# Patient Record
Sex: MALE | Race: WHITE | NOT HISPANIC OR LATINO | URBAN - METROPOLITAN AREA
[De-identification: names, ages, dates, MRNs, and addresses within clinical notes are randomized per-mention and may not be internally consistent; named-entity substitution may affect disease eponyms.]

---

## 2017-01-06 NOTE — ASU PATIENT PROFILE, ADULT - PSH
S/P cataract surgery  both eyes  S/P hernia surgery    S/P LASIK H/O hand surgery  right  H/O vitrectomy  left eye  S/P cataract surgery  both eyes  S/P hernia surgery    S/P LASIK    S/P TURP

## 2017-01-06 NOTE — ASU PATIENT PROFILE, ADULT - PMH
HTN (hypertension)    Prostate cancer  scheduled for surgery next month HTN (hypertension)    Prostate cancer

## 2017-01-09 ENCOUNTER — OUTPATIENT (OUTPATIENT)
Dept: OUTPATIENT SERVICES | Facility: HOSPITAL | Age: 72
LOS: 1 days | End: 2017-01-09
Payer: MEDICARE

## 2017-01-09 VITALS
OXYGEN SATURATION: 97 % | HEART RATE: 69 BPM | DIASTOLIC BLOOD PRESSURE: 83 MMHG | RESPIRATION RATE: 18 BRPM | SYSTOLIC BLOOD PRESSURE: 121 MMHG

## 2017-01-09 VITALS
RESPIRATION RATE: 22 BRPM | OXYGEN SATURATION: 100 % | HEART RATE: 68 BPM | WEIGHT: 182.54 LBS | TEMPERATURE: 98 F | DIASTOLIC BLOOD PRESSURE: 91 MMHG | HEIGHT: 67 IN | SYSTOLIC BLOOD PRESSURE: 143 MMHG

## 2017-01-09 DIAGNOSIS — Z98.890 OTHER SPECIFIED POSTPROCEDURAL STATES: Chronic | ICD-10-CM

## 2017-01-09 DIAGNOSIS — H35.372 PUCKERING OF MACULA, LEFT EYE: ICD-10-CM

## 2017-01-09 DIAGNOSIS — Z90.79 ACQUIRED ABSENCE OF OTHER GENITAL ORGAN(S): Chronic | ICD-10-CM

## 2017-01-09 PROCEDURE — 67042 VIT FOR MACULAR HOLE: CPT | Mod: LT

## 2017-01-09 NOTE — ASU DISCHARGE PLAN (ADULT/PEDIATRIC). - NOTIFY
Persistent Nausea and Vomiting/Swelling that continues/Bleeding that does not stop/Fever greater than 101/Pain not relieved by Medications

## 2018-06-14 ENCOUNTER — APPOINTMENT (OUTPATIENT)
Dept: POPULATION HEALTH | Facility: CLINIC | Age: 73
End: 2018-06-14
Payer: COMMERCIAL

## 2018-06-14 PROBLEM — C61 MALIGNANT NEOPLASM OF PROSTATE: Chronic | Status: ACTIVE | Noted: 2017-01-09

## 2018-06-14 PROCEDURE — 36415 COLL VENOUS BLD VENIPUNCTURE: CPT

## 2019-06-13 ENCOUNTER — APPOINTMENT (OUTPATIENT)
Dept: POPULATION HEALTH | Facility: CLINIC | Age: 74
End: 2019-06-13
Payer: COMMERCIAL

## 2019-06-13 PROCEDURE — 36415 COLL VENOUS BLD VENIPUNCTURE: CPT

## 2020-01-26 NOTE — ASU PREOP CHECKLIST - LATEX ALLERGY
[facility] Medicine  History and Physical    Patient:  Nelson Monroe  MRN: [id number]    CHIEF COMPLAINT:  Palpitation    History Obtained From: Patient  Family present for interview:     PCP: Lio Daly DO    HISTORY OF PRESENT ILLNESS:  66 y.o. male who presents with palpitations that he \"thought was VT. \" his defibrillator was interrogated in the emergency department and showed no episodes of SVT or VT. magnesium level within normal limits. Sodium, potassium, creatinine are within normal limits. Patient was admitted to the medicine service for further evaluation and treatment of congestive heart failure and palpitations. Review of prior records from [facility] in [address] [place] [address] record system showed:    Patient has a history of VT, HCM, defibrillator. Transthoracic echocardiogram performed at [address] [place] [address] on 7/2/2019 showed LVEF 32%, grade 2 diastolic dysfunction, severe asymmetric hypertrophy of the septum 2.6 cm, posterior wall thickness 1.5 cm, akinesis of the apical anterior apical septal apical lateral and apical myocardium. Nuclear stress test performed on 7/3/2019 at [facility] of [address] [place] [address],[address] showed:  1. Left ventricular myocardial perfusion demonstrates a large infarct involving the inferior wall with extension into contiguous lateral wall particularly toward the apex.  There is no significant ischemia identified. 2.  The left ventricular ejection fraction (LVEF) is 35%. 3.  The left ventricular wall motion demonstrates decreased motion centered at the apex with extension into immediately contiguous portions of the other wall segments. A left heart catheterization performed at [address] [place] [address] in [facility] on 9/27/2019 showed minor luminal irregularities of the LAD, first obtuse marginal and right coronary artery and otherwise angiographically normal coronary arteries.     REVIEW OF SYSTEMS:  Review of Systems  He denies loss of consciousness, dizziness. He complains of some vague pressure-like pain at the same time he had sensation of palpitations. Patient is a poor historian. Norris is unable to recall prior cardiac testing that was done within the last year. Records reviewed from outside hospital as described in the history of present illness. All other 14 review of systems negative except as noted above. Past Medical History:      Diagnosis Date    Cardiomyopathy [facility])     per pt    CHF (congestive heart failure) (HCC)     Hyperlipidemia     Hypertension     Prolonged emergence from general anesthesia     patient states he does not come out of anesthesia well-he acts ugly       Past Surgical History:      Procedure Laterality Date    PACEMAKER INSERTION      PACEMAKER PLACEMENT      [employer]    ULTRASOUND PROSTATE/TRANSRECTAL N/A 12/31/2019    TRANSRECTAL ULTRASOUND BIOPSY OF PROSTATE performed by Sri Austin MD at [address] [place] [address] OR       Medications Prior to Admission:    Prior to Admission medications    Medication Sig Start Date End Date Taking?  Authorizing Provider   naproxen (NAPROSYN) 500 MG tablet Take 1 tablet by mouth 2 times daily for 15 days 1/24/20 2/8/20 Yes BALBIR Dorsey   methocarbamol (ROBAXIN) 500 MG tablet Take 1 tablet by mouth 3 times daily for 7 days 1/24/20 1/31/20 Yes BALBIR Dorsey   carvedilol (COREG) 12.5 MG tablet Take 12.5 mg by mouth daily  8/19/19  Yes Historical Provider, MD   amiodarone (PACERONE) 400 MG tablet Take 400 mg by mouth daily  12/18/15  Yes Historical Provider, MD   lisinopril (PRINIVIL;ZESTRIL) 20 MG tablet lisinopril 20 mg tablet 7/4/19  Yes Historical Provider, MD   aspirin 81 MG tablet Take 81 mg by mouth daily    Yes Historical Provider, MD   simvastatin (ZOCOR) 20 MG tablet Take 20 mg by mouth nightly   Yes Historical Provider, MD   HYDROcodone-acetaminophen (NORCO) 7.5-325 MG per tablet Take 1 tablet by mouth every 6 hours as needed for Pain for up to 3 days. Intended supply: 3 days. Take lowest dose possible to manage pain 1/24/20 1/27/20  BALBIR Dorsey   colchicine (COLCRYS) 0.6 MG tablet Take 0.6 mg by mouth daily as needed     Historical Provider, MD   fluticasone (FLONASE) 50 MCG/ACT nasal spray 2 sprays by Nasal route daily as needed     Historical Provider, MD   indomethacin (INDOCIN) 50 MG capsule Take 50 mg by mouth daily as needed     Historical Provider, MD   nitroGLYCERIN (NITROSTAT) 0.4 MG SL tablet Place 0.4 mg under the tongue every 5 minutes as needed     Historical Provider, MD   cloNIDine (CATAPRES) 0.1 MG tablet Take 0.2 mg by mouth daily     Historical Provider, MD       Allergies:  Patient has no known allergies. Social History:   TOBACCO:   reports that he has never smoked. He has never used smokeless tobacco.  ETOH:   reports no history of alcohol use. Social History     Substance and Sexual Activity   Drug Use Never     Family History:       Family history unknown: Yes       Physical Exam:    Vitals: /67   Pulse (!) 49   Temp 98.5 °F (36.9 °C) (Oral)   Resp 14   Ht 6' 3\" (1.905 m)   Wt 299 lb (135.6 kg)   SpO2 93%   BMI 37.37 kg/m²   24HR INTAKE/OUTPUT:  No intake or output data in the 24 hours ending 01/26/20 0358    General: Alert and cooperative with exam.  Obese body habitus. Appears older than stated age and unhealthy. Unable to assess JVP due to body habitus. Respiratory: Inspection of chest normal.  No use of accessory muscles, normal diaphragmatic movements. Lungs clear to auscultation bilaterally without rales, rhonchi or wheezes. Cardiac: Regular rate and rhythm, S1, S2 normal, no murmur, click, no gallop or rubs. No reproducible chest pain on palpation of the chest wall. Vascular: 2+ peripheral pulses with no cyanosis. Abdomen: Soft, non-tender; no peritoneal signs, bowel sounds normal; no masses,  no organomegaly. Musculoskeletal: Normal joints on inspection.   No clubbing, no cyanosis or edema. Neurologic: Awake alert and oriented x3, affect and mood appropriate. Speech is fluent. Intact remote and recent memory. No focal neurologic deficits, CN II-XII intact. Psychiatric: Does not seem depressed, anxious or agitated. Denies suicidal or homicidal ideation. Skin: No rashes, nodules. Lymphatic: No lymphadenopathy. Hematologic: No bruises, petechiae. CBC:   Recent Labs     20   WBC 7.0   HGB 15.9        BMP:    Recent Labs     20      K 4.5      CO2 23   BUN 16   CREATININE 1.2   GLUCOSE 103     CMP:   Recent Labs     20      K 4.5      CO2 23   BUN 16   CREATININE 1.2   GLUCOSE 103   CALCIUM 9.2   BILITOT 0.5   ALKPHOS 64   AST 26   ALT 18     Hepatic:   Recent Labs     20   AST 26   ALT 18   BILITOT 0.5   ALKPHOS 64       Lactid Acid:  No results for input(s): LACTA in the last 72 hours. Procalcitonin:     Troponin T:   Recent Labs     20  0300   TROPONINI 0.02 0.02     Pro-BNP: No results for input(s): BNP in the last 72 hours. Lipids: No results for input(s): CHOL, HDL in the last 72 hours. Invalid input(s): LDLCALCU  INR:   Recent Labs     20   INR 1.13     A1c:Invalid input(s): HEMOGLOBIN A1C    SEPSIS Criteria:   Temp: Temp  Av.5 °F (36.9 °C)  Min: 98.5 °F (36.9 °C)  Max: 98.5 °F (36.9 °C)    HR Range: Pulse  Av  Min: 49  Max: 59   RR Range: Resp  Avg: 15.5  Min: 14  Max: 17   WBC:   Recent Labs     20   WBC 7.0      Lactic acid: No results for input(s): LACTA in the last 72 hours.    Creatinine:   Recent Labs     20   CREATININE 1.2      Troponin:   Recent Labs     20  0300   TROPONINI 0.02       LFTs:   Recent Labs     20   AST 26   ALT 18   BILITOT 0.5   ALKPHOS 64         -----------------------------------------------------------------    Imaging Studies:    XR CHEST PORTABLE    (Results Pending)       I have personally reviewed the following images:        EKG:     ECG from 1/26/202 showed left bundle branch block, normal sinus rhythm, left atrial enlargement,0 left anterior fascicular block and atrial paced and LVH. ECG from 12/27/19 showed right bundle branch block normal sinus rhythm left anterior fascicular block AV pacing and LVH. Assessment     Principal Problem:    Systolic heart failure (HCC)  Active Problems:    Palpitations    Hypertrophy of inter-atrial septum    Diastolic heart failure (HCC)    Poisoning by cardiac antiarrhythmic agent    CHF (congestive heart failure), NYHA class III, acute on chronic, combined ([facility] [address]  Resolved Problems:    * No resolved hospital problems. *      Plan     --Admit to the medicine floor overnight for cardiac monitoring. Maintain on telemetry. Serial cardiac enzymes. Consult cardiology for further evaluation. Echocardiogram ordered for the morning. -- DVT prophylaxis: SQ heparin  SQ Lovenox  Systemic anticoagulation with     -- CODE STATUS: Full code    Total face-to-face time with this patient, time spent reviewing medical records and in coordination of care with the emergency department physician, nursing staff was 75 minutes.     Signed:  Jazmín Mancilla MD 1/26/2020 3:58 AM  Admitting Hospitalist no

## 2020-06-18 ENCOUNTER — APPOINTMENT (OUTPATIENT)
Dept: POPULATION HEALTH | Facility: CLINIC | Age: 75
End: 2020-06-18
Payer: COMMERCIAL

## 2020-06-18 PROCEDURE — 36415 COLL VENOUS BLD VENIPUNCTURE: CPT

## 2022-02-11 ENCOUNTER — OUTPATIENT (OUTPATIENT)
Dept: OUTPATIENT SERVICES | Facility: HOSPITAL | Age: 77
LOS: 1 days | End: 2022-02-11
Payer: MEDICARE

## 2022-02-11 VITALS
OXYGEN SATURATION: 97 % | WEIGHT: 197.53 LBS | DIASTOLIC BLOOD PRESSURE: 80 MMHG | RESPIRATION RATE: 20 BRPM | HEIGHT: 67 IN | TEMPERATURE: 97 F | SYSTOLIC BLOOD PRESSURE: 155 MMHG | HEART RATE: 61 BPM

## 2022-02-11 DIAGNOSIS — I47.1 SUPRAVENTRICULAR TACHYCARDIA: ICD-10-CM

## 2022-02-11 DIAGNOSIS — Z98.890 OTHER SPECIFIED POSTPROCEDURAL STATES: Chronic | ICD-10-CM

## 2022-02-11 DIAGNOSIS — Z90.79 ACQUIRED ABSENCE OF OTHER GENITAL ORGAN(S): Chronic | ICD-10-CM

## 2022-02-11 DIAGNOSIS — Z01.818 ENCOUNTER FOR OTHER PREPROCEDURAL EXAMINATION: ICD-10-CM

## 2022-02-11 DIAGNOSIS — I10 ESSENTIAL (PRIMARY) HYPERTENSION: ICD-10-CM

## 2022-02-11 DIAGNOSIS — G47.33 OBSTRUCTIVE SLEEP APNEA (ADULT) (PEDIATRIC): ICD-10-CM

## 2022-02-11 DIAGNOSIS — Z91.89 OTHER SPECIFIED PERSONAL RISK FACTORS, NOT ELSEWHERE CLASSIFIED: ICD-10-CM

## 2022-02-11 DIAGNOSIS — Z96.652 PRESENCE OF LEFT ARTIFICIAL KNEE JOINT: Chronic | ICD-10-CM

## 2022-02-11 LAB
ANION GAP SERPL CALC-SCNC: 11 MMOL/L — SIGNIFICANT CHANGE UP (ref 5–17)
APTT BLD: 30 SEC — SIGNIFICANT CHANGE UP (ref 27.5–35.5)
BASOPHILS # BLD AUTO: 0 K/UL — SIGNIFICANT CHANGE UP (ref 0–0.2)
BASOPHILS NFR BLD AUTO: 0 % — SIGNIFICANT CHANGE UP (ref 0–2)
BLD GP AB SCN SERPL QL: SIGNIFICANT CHANGE UP
BUN SERPL-MCNC: 15.9 MG/DL — SIGNIFICANT CHANGE UP (ref 8–20)
CALCIUM SERPL-MCNC: 9.9 MG/DL — SIGNIFICANT CHANGE UP (ref 8.6–10.2)
CHLORIDE SERPL-SCNC: 105 MMOL/L — SIGNIFICANT CHANGE UP (ref 98–107)
CO2 SERPL-SCNC: 26 MMOL/L — SIGNIFICANT CHANGE UP (ref 22–29)
CREAT SERPL-MCNC: 0.99 MG/DL — SIGNIFICANT CHANGE UP (ref 0.5–1.3)
ELLIPTOCYTES BLD QL SMEAR: SLIGHT — SIGNIFICANT CHANGE UP
EOSINOPHIL # BLD AUTO: 0.14 K/UL — SIGNIFICANT CHANGE UP (ref 0–0.5)
EOSINOPHIL NFR BLD AUTO: 2.6 % — SIGNIFICANT CHANGE UP (ref 0–6)
GIANT PLATELETS BLD QL SMEAR: PRESENT — SIGNIFICANT CHANGE UP
GLUCOSE SERPL-MCNC: 96 MG/DL — SIGNIFICANT CHANGE UP (ref 70–99)
HCT VFR BLD CALC: 45.6 % — SIGNIFICANT CHANGE UP (ref 39–50)
HGB BLD-MCNC: 15.3 G/DL — SIGNIFICANT CHANGE UP (ref 13–17)
INR BLD: 0.97 RATIO — SIGNIFICANT CHANGE UP (ref 0.88–1.16)
LYMPHOCYTES # BLD AUTO: 0.87 K/UL — LOW (ref 1–3.3)
LYMPHOCYTES # BLD AUTO: 15.8 % — SIGNIFICANT CHANGE UP (ref 13–44)
MAGNESIUM SERPL-MCNC: 2.2 MG/DL — SIGNIFICANT CHANGE UP (ref 1.6–2.6)
MANUAL SMEAR VERIFICATION: SIGNIFICANT CHANGE UP
MCHC RBC-ENTMCNC: 31.2 PG — SIGNIFICANT CHANGE UP (ref 27–34)
MCHC RBC-ENTMCNC: 33.6 GM/DL — SIGNIFICANT CHANGE UP (ref 32–36)
MCV RBC AUTO: 92.9 FL — SIGNIFICANT CHANGE UP (ref 80–100)
MONOCYTES # BLD AUTO: 0.97 K/UL — HIGH (ref 0–0.9)
MONOCYTES NFR BLD AUTO: 17.6 % — HIGH (ref 2–14)
NEUTROPHILS # BLD AUTO: 3.39 K/UL — SIGNIFICANT CHANGE UP (ref 1.8–7.4)
NEUTROPHILS NFR BLD AUTO: 61.4 % — SIGNIFICANT CHANGE UP (ref 43–77)
OVALOCYTES BLD QL SMEAR: SLIGHT — SIGNIFICANT CHANGE UP
PLAT MORPH BLD: NORMAL — SIGNIFICANT CHANGE UP
PLATELET # BLD AUTO: 199 K/UL — SIGNIFICANT CHANGE UP (ref 150–400)
POIKILOCYTOSIS BLD QL AUTO: SLIGHT — SIGNIFICANT CHANGE UP
POTASSIUM SERPL-MCNC: 5.2 MMOL/L — SIGNIFICANT CHANGE UP (ref 3.5–5.3)
POTASSIUM SERPL-SCNC: 5.2 MMOL/L — SIGNIFICANT CHANGE UP (ref 3.5–5.3)
PROTHROM AB SERPL-ACNC: 11.3 SEC — SIGNIFICANT CHANGE UP (ref 10.6–13.6)
RBC # BLD: 4.91 M/UL — SIGNIFICANT CHANGE UP (ref 4.2–5.8)
RBC # FLD: 13.4 % — SIGNIFICANT CHANGE UP (ref 10.3–14.5)
RBC BLD AUTO: ABNORMAL
SODIUM SERPL-SCNC: 142 MMOL/L — SIGNIFICANT CHANGE UP (ref 135–145)
VARIANT LYMPHS # BLD: 2.6 % — SIGNIFICANT CHANGE UP (ref 0–6)
WBC # BLD: 5.52 K/UL — SIGNIFICANT CHANGE UP (ref 3.8–10.5)
WBC # FLD AUTO: 5.52 K/UL — SIGNIFICANT CHANGE UP (ref 3.8–10.5)

## 2022-02-11 PROCEDURE — G0463: CPT

## 2022-02-11 PROCEDURE — 93005 ELECTROCARDIOGRAM TRACING: CPT

## 2022-02-11 PROCEDURE — 93010 ELECTROCARDIOGRAM REPORT: CPT

## 2022-02-11 RX ORDER — MELOXICAM 15 MG/1
1 TABLET ORAL
Qty: 0 | Refills: 0 | DISCHARGE

## 2022-02-11 NOTE — H&P PST ADULT - NSICDXPASTSURGICALHX_GEN_ALL_CORE_FT
PAST SURGICAL HISTORY:  H/O hand surgery right    H/O vitrectomy left eye    S/P cataract surgery both eyes    S/P hernia surgery     S/P LASIK     S/P TURP      PAST SURGICAL HISTORY:  H/O eye surgery left eye detatched retina - 2012    H/O hand surgery right 1971    H/O prostatectomy 2012    H/O vitrectomy left eye    S/P cataract surgery both eyes    S/P hernia surgery 1985    S/P LASIK     S/P TURP      PAST SURGICAL HISTORY:  H/O eye surgery left eye detatched retina - 2012    H/O hand surgery right 1971    H/O prostatectomy 2012    H/O total knee replacement, left 2012    H/O vitrectomy left eye    S/P cataract surgery both eyes    S/P hernia surgery 1985    S/P LASIK     S/P TURP

## 2022-02-11 NOTE — H&P PST ADULT - PROBLEM SELECTOR PLAN 1
Hold Flecainide and metoprolol 3 days before procedure as per Dr. Johnson Hold Flecainide and metoprolol 3 days before procedure as per Dr. Johnson  Patient is scheduled for SVT ablation/ ANES/ LEESA/ ABBOT with Dr. Dirk Johnson on 2/23/2022

## 2022-02-11 NOTE — H&P PST ADULT - ATTENDING COMMENTS
All risks, benefits and alternatives discussed with patient. He agrees to proceed.    Dirk Johnson MD  Clinical Cardiac Electrophysiology

## 2022-02-11 NOTE — H&P PST ADULT - ALLERGY TYPES
dog dander/outdoor environmental allergies/reactions to medicines/reactions to food/reactions to animals

## 2022-02-11 NOTE — H&P PST ADULT - PROBLEM SELECTOR PLAN 2
Caprini Score, at risk, surgical team to order appropriate VTE prophylaxis Caprini Score 6, at risk, surgical team to order appropriate VTE prophylaxis

## 2022-02-11 NOTE — H&P PST ADULT - PROBLEM SELECTOR PLAN 4
Intermediate/High Risk, Stop Bang Score 5, RUBEN precautions High Risk, Stop Bang Score 5, RUBEN precautions

## 2022-02-11 NOTE — H&P PST ADULT - PROBLEM SELECTOR PLAN 3
Take lisinopril the morning of procedure with small sip of water Take lisinopril and amlodipine the morning of procedure with small sip of water  monitor BP

## 2022-02-11 NOTE — H&P PST ADULT - NSICDXPASTMEDICALHX_GEN_ALL_CORE_FT
PAST MEDICAL HISTORY:  Atrial tachycardia     HTN (hypertension)     Prostate cancer      PAST MEDICAL HISTORY:  Atrial tachycardia     Glaucoma     HTN (hypertension)     Prostate cancer     SVT (supraventricular tachycardia)

## 2022-02-11 NOTE — H&P PST ADULT - ASSESSMENT
Plan:   Labs pending.   Pre-procedure instructions provided (verbal & written) as follows:  Ablation 2022    - NPO after midnight prior except meds w/ sips of water.    - Hold Flecainide and metoprolol 3 days before procedure as per Dr. Jonhson   - Take lisinopril the morning of procedure with small sip of water  - Covid-19 PCT test scheduled for 2022      CAPRINI SCORE [CLOT]    AGE RELATED RISK FACTORS                                                       MOBILITY RELATED FACTORS  [ ] Age 41-60 years                                            (1 Point)                  [ ] Bed rest                                                        (1 Point)  [ ] Age: 61-74 years                                           (2 Points)                 [ ] Plaster cast                                                   (2 Points)  [ ] Age= 75 years                                              (3 Points)                 [ ] Bed bound for more than 72 hours                 (2 Points)    DISEASE RELATED RISK FACTORS                                               GENDER SPECIFIC FACTORS  [ ] Edema in the lower extremities                       (1 Point)                  [ ] Pregnancy                                                     (1 Point)  [ ] Varicose veins                                               (1 Point)                  [ ] Post-partum < 6 weeks                                   (1 Point)             [ ] BMI > 25 Kg/m2                                            (1 Point)                  [ ] Hormonal therapy  or oral contraception          (1 Point)                 [ ] Sepsis (in the previous month)                        (1 Point)                  [ ] History of pregnancy complications                 (1 point)  [ ] Pneumonia or serious lung disease                                               [ ] Unexplained or recurrent                     (1 Point)           (in the previous month)                               (1 Point)  [ ] Abnormal pulmonary function test                     (1 Point)                 SURGERY RELATED RISK FACTORS  [ ] Acute myocardial infarction                              (1 Point)                 [ ]  Section                                             (1 Point)  [ ] Congestive heart failure (in the previous month)  (1 Point)               [ ] Minor surgery                                                  (1 Point)   [ ] Inflammatory bowel disease                             (1 Point)                 [ ] Arthroscopic surgery                                        (2 Points)  [ ] Central venous access                                      (2 Points)                [ ] General surgery lasting more than 45 minutes   (2 Points)       [ ] Stroke (in the previous month)                          (5 Points)               [ ] Elective arthroplasty                                         (5 Points)                                                                                                                                               HEMATOLOGY RELATED FACTORS                                                 TRAUMA RELATED RISK FACTORS  [ ] Prior episodes of VTE                                     (3 Points)                [ ] Fracture of the hip, pelvis, or leg                       (5 Points)  [ ] Positive family history for VTE                         (3 Points)                 [ ] Acute spinal cord injury (in the previous month)  (5 Points)  [ ] Prothrombin 81761 A                                     (3 Points)                 [ ] Paralysis  (less than 1 month)                             (5 Points)  [ ] Factor V Leiden                                             (3 Points)                  [ ] Multiple Trauma within 1 month                        (5 Points)  [ ] Lupus anticoagulants                                     (3 Points)                                                           [ ] Anticardiolipin antibodies                               (3 Points)                                                       [ ] High homocysteine in the blood                      (3 Points)                                             [ ] Other congenital or acquired thrombophilia      (3 Points)                                                [ ] Heparin induced thrombocytopenia                  (3 Points)                                          Total Score [          ]    Caprini Score 0 - 2:  Low Risk, No VTE Prophylaxis required for most patients, encourage ambulation  Caprini Score 3 - 6:  At Risk, pharmacologic VTE prophylaxis is indicated for most patients (in the absence of a contraindication)  Caprini Score Greater than or = 7:  High Risk, pharmacologic VTE prophylaxis is indicated for most patients (in the absence of a contraindication)    OPIOID RISK TOOL    HELLEN EACH BOX THAT APPLIES AND ADD TOTALS AT THE END    FAMILY HISTORY OF SUBSTANCE ABUSE                 FEMALE         MALE                                                Alcohol                             [  ]1 pt          [  ]3pts                                               Illegal Durgs                     [  ]2 pts        [  ]3pts                                               Rx Drugs                           [  ]4 pts        [  ]4 pts    PERSONAL HISTORY OF SUBSTANCE ABUSE                                                                                          Alcohol                             [  ]3 pts       [  ]3 pts                                               Illegal Drugs                     [  ]4 pts        [  ]4 pts                                               Rx Drugs                           [  ]5 pts        [  ]5 pts    AGE BETWEEN 16-45 YEARS                                      [  ]1 pt         [  ]1 pt    HISTORY OF PREADOLESCENT   SEXUAL ABUSE                                                             [  ]3 pts        [  ]0pts    PSYCHOLOGICAL DISEASE                     ADD, OCD, Bipolar, Schizophrenia        [  ]2 pts         [  ]2 pts                      Depression                                               [  ]1 pt           [  ]1 pt           SCORING TOTAL   (add numbers and type here)              (***)                                     A score of 3 or lower indicated LOW risk for future opioid abuse  A score of 4 to 7 indicated moderate risk for future opioid abuse  A score of 8 or higher indicates a high risk for opioid abuse     77y Male with history of HTN, glaucoma, prostate CA s/p prostatectomy  and symptomatic atrial tachycardia presents to PST. Patient was seen in [facility] in 2021 for symptoms of dyspnea on exertion and chest tightness found to have an arrythmia of atrial tachycardia. He was noted to have salcos of atrial tachycardia so he was treated with AVN blockers, He underwent TTE which showed an LVEF of 45-50%. Stress testing revealed no perfusion abnormalities with and LVEF of 44%. He was started on flecainide 100mg BID as well as Metoprolol 100mg BID and was discharged. Patient states he has been taking his medication as prescribed with no complications. He denies palpitations, chest pain, dizziness/lightheadedness or syncope. States he has been more tired in the afternoons. Patient is scheduled for SVT ablation/ ANES/ LEESA/ ABBOT with Dr. Dirk Johnson on 2022.     Plan:   Labs pending.   Pre-procedure instructions provided (verbal & written) as follows:  Ablation 2022    - NPO after midnight prior except meds w/ sips of water.    - Hold Flecainide and metoprolol 3 days before procedure as per Dr. Johnson   - Take lisinopril the morning of procedure with small sip of water   - Covid-19 PCT test scheduled for 2022      CAPRINI SCORE [CLOT]    AGE RELATED RISK FACTORS                                                       MOBILITY RELATED FACTORS  [ ] Age 41-60 years                                            (1 Point)                  [ ] Bed rest                                                        (1 Point)  [ ] Age: 61-74 years                                           (2 Points)                 [ ] Plaster cast                                                   (2 Points)  [ ] Age= 75 years                                              (3 Points)                 [ ] Bed bound for more than 72 hours                 (2 Points)    DISEASE RELATED RISK FACTORS                                               GENDER SPECIFIC FACTORS  [ ] Edema in the lower extremities                       (1 Point)                  [ ] Pregnancy                                                     (1 Point)  [ ] Varicose veins                                               (1 Point)                  [ ] Post-partum < 6 weeks                                   (1 Point)             [ ] BMI > 25 Kg/m2                                            (1 Point)                  [ ] Hormonal therapy  or oral contraception          (1 Point)                 [ ] Sepsis (in the previous month)                        (1 Point)                  [ ] History of pregnancy complications                 (1 point)  [ ] Pneumonia or serious lung disease                                               [ ] Unexplained or recurrent                     (1 Point)           (in the previous month)                               (1 Point)  [ ] Abnormal pulmonary function test                     (1 Point)                 SURGERY RELATED RISK FACTORS  [ ] Acute myocardial infarction                              (1 Point)                 [ ]  Section                                             (1 Point)  [ ] Congestive heart failure (in the previous month)  (1 Point)               [ ] Minor surgery                                                  (1 Point)   [ ] Inflammatory bowel disease                             (1 Point)                 [ ] Arthroscopic surgery                                        (2 Points)  [ ] Central venous access                                      (2 Points)                [ ] General surgery lasting more than 45 minutes   (2 Points)       [ ] Stroke (in the previous month)                          (5 Points)               [ ] Elective arthroplasty                                         (5 Points)                                                                                                                                               HEMATOLOGY RELATED FACTORS                                                 TRAUMA RELATED RISK FACTORS  [ ] Prior episodes of VTE                                     (3 Points)                [ ] Fracture of the hip, pelvis, or leg                       (5 Points)  [ ] Positive family history for VTE                         (3 Points)                 [ ] Acute spinal cord injury (in the previous month)  (5 Points)  [ ] Prothrombin 07922 A                                     (3 Points)                 [ ] Paralysis  (less than 1 month)                             (5 Points)  [ ] Factor V Leiden                                             (3 Points)                  [ ] Multiple Trauma within 1 month                        (5 Points)  [ ] Lupus anticoagulants                                     (3 Points)                                                           [ ] Anticardiolipin antibodies                               (3 Points)                                                       [ ] High homocysteine in the blood                      (3 Points)                                             [ ] Other congenital or acquired thrombophilia      (3 Points)                                                [ ] Heparin induced thrombocytopenia                  (3 Points)                                          Total Score [          ]    Caprini Score 0 - 2:  Low Risk, No VTE Prophylaxis required for most patients, encourage ambulation  Caprini Score 3 - 6:  At Risk, pharmacologic VTE prophylaxis is indicated for most patients (in the absence of a contraindication)  Caprini Score Greater than or = 7:  High Risk, pharmacologic VTE prophylaxis is indicated for most patients (in the absence of a contraindication)    OPIOID RISK TOOL    HELLEN EACH BOX THAT APPLIES AND ADD TOTALS AT THE END    FAMILY HISTORY OF SUBSTANCE ABUSE                 FEMALE         MALE                                                Alcohol                             [  ]1 pt          [  ]3pts                                               Illegal Durgs                     [  ]2 pts        [  ]3pts                                               Rx Drugs                           [  ]4 pts        [  ]4 pts    PERSONAL HISTORY OF SUBSTANCE ABUSE                                                                                          Alcohol                             [  ]3 pts       [  ]3 pts                                               Illegal Drugs                     [  ]4 pts        [  ]4 pts                                               Rx Drugs                           [  ]5 pts        [  ]5 pts    AGE BETWEEN 16-45 YEARS                                      [  ]1 pt         [  ]1 pt    HISTORY OF PREADOLESCENT   SEXUAL ABUSE                                                             [  ]3 pts        [  ]0pts    PSYCHOLOGICAL DISEASE                     ADD, OCD, Bipolar, Schizophrenia        [  ]2 pts         [  ]2 pts                      Depression                                               [  ]1 pt           [  ]1 pt           SCORING TOTAL   (add numbers and type here)              (***)                                     A score of 3 or lower indicated LOW risk for future opioid abuse  A score of 4 to 7 indicated moderate risk for future opioid abuse  A score of 8 or higher indicates a high risk for opioid abuse     77y Male with history of HTN, glaucoma, prostate CA s/p prostatectomy  and symptomatic atrial tachycardia presents to PST. Patient was seen in [facility] in 2021 for symptoms of dyspnea on exertion and chest tightness found to have an arrythmia of atrial tachycardia. He was noted to have salcos of atrial tachycardia so he was treated with AVN blockers, He underwent TTE which showed an LVEF of 45-50%. Stress testing revealed no perfusion abnormalities with and LVEF of 44%. He was started on flecainide 100mg BID as well as Metoprolol 100mg BID and was discharged. Patient states he has been taking his medication as prescribed with no complications. He denies palpitations, chest pain, dizziness/lightheadedness or syncope. States he has been more tired in the afternoons. Patient is scheduled for SVT ablation/ ANES/ LEESA/ ABBOT with Dr. Dirk Johnson on 2022.     Plan:   Labs pending.   Pre-procedure instructions provided (verbal & written) as follows:  Ablation 2022    - NPO after midnight prior except meds w/ sips of water.    - Hold Flecainide and metoprolol 3 days before procedure as per Dr. Johnson   - Take lisinopril and amlodipine the morning of procedure with small sip of water   - Covid-19 PCT test scheduled for 2022      CAPRINI SCORE [CLOT]    AGE RELATED RISK FACTORS                                                       MOBILITY RELATED FACTORS  [ ] Age 41-60 years                                            (1 Point)                  [ ] Bed rest                                                        (1 Point)  [ ] Age: 61-74 years                                           (2 Points)                 [ ] Plaster cast                                                   (2 Points)  [x ] Age= 75 years                                              (3 Points)                 [ ] Bed bound for more than 72 hours                 (2 Points)    DISEASE RELATED RISK FACTORS                                               GENDER SPECIFIC FACTORS  [ ] Edema in the lower extremities                       (1 Point)                  [ ] Pregnancy                                                     (1 Point)  [ ] Varicose veins                                               (1 Point)                  [ ] Post-partum < 6 weeks                                   (1 Point)             [x ] BMI > 25 Kg/m2                                            (1 Point)                  [ ] Hormonal therapy  or oral contraception          (1 Point)                 [ ] Sepsis (in the previous month)                        (1 Point)                  [ ] History of pregnancy complications                 (1 point)  [ ] Pneumonia or serious lung disease                                               [ ] Unexplained or recurrent                     (1 Point)           (in the previous month)                               (1 Point)  [ ] Abnormal pulmonary function test                     (1 Point)                 SURGERY RELATED RISK FACTORS  [ ] Acute myocardial infarction                              (1 Point)                 [ ]  Section                                             (1 Point)  [ ] Congestive heart failure (in the previous month)  (1 Point)               [ ] Minor surgery                                                  (1 Point)   [ ] Inflammatory bowel disease                             (1 Point)                 [ ] Arthroscopic surgery                                        (2 Points)  [ ] Central venous access                                      (2 Points)                [x ] General surgery lasting more than 45 minutes   (2 Points)       [ ] Stroke (in the previous month)                          (5 Points)               [ ] Elective arthroplasty                                         (5 Points)                                                                                                                                               HEMATOLOGY RELATED FACTORS                                                 TRAUMA RELATED RISK FACTORS  [ ] Prior episodes of VTE                                     (3 Points)                [ ] Fracture of the hip, pelvis, or leg                       (5 Points)  [ ] Positive family history for VTE                         (3 Points)                 [ ] Acute spinal cord injury (in the previous month)  (5 Points)  [ ] Prothrombin 46786 A                                     (3 Points)                 [ ] Paralysis  (less than 1 month)                             (5 Points)  [ ] Factor V Leiden                                             (3 Points)                  [ ] Multiple Trauma within 1 month                        (5 Points)  [ ] Lupus anticoagulants                                     (3 Points)                                                           [ ] Anticardiolipin antibodies                               (3 Points)                                                       [ ] High homocysteine in the blood                      (3 Points)                                             [ ] Other congenital or acquired thrombophilia      (3 Points)                                                [ ] Heparin induced thrombocytopenia                  (3 Points)                                          Total Score [   6     ]    Caprini Score 0 - 2:  Low Risk, No VTE Prophylaxis required for most patients, encourage ambulation  Caprini Score 3 - 6:  At Risk, pharmacologic VTE prophylaxis is indicated for most patients (in the absence of a contraindication)  Caprini Score Greater than or = 7:  High Risk, pharmacologic VTE prophylaxis is indicated for most patients (in the absence of a contraindication)    OPIOID RISK TOOL    HELLEN EACH BOX THAT APPLIES AND ADD TOTALS AT THE END    FAMILY HISTORY OF SUBSTANCE ABUSE                 FEMALE         MALE                                                Alcohol                             [  ]1 pt          [  ]3pts                                               Illegal Durgs                     [  ]2 pts        [  ]3pts                                               Rx Drugs                           [  ]4 pts        [  ]4 pts    PERSONAL HISTORY OF SUBSTANCE ABUSE                                                                                          Alcohol                             [  ]3 pts       [  ]3 pts                                               Illegal Drugs                     [  ]4 pts        [  ]4 pts                                               Rx Drugs                           [  ]5 pts        [  ]5 pts    AGE BETWEEN 16-45 YEARS                                      [  ]1 pt         [  ]1 pt    HISTORY OF PREADOLESCENT   SEXUAL ABUSE                                                             [  ]3 pts        [  ]0pts    PSYCHOLOGICAL DISEASE                     ADD, OCD, Bipolar, Schizophrenia        [  ]2 pts         [  ]2 pts                      Depression                                               [  ]1 pt           [  ]1 pt           SCORING TOTAL   (add numbers and type here)              (0)                                     A score of 3 or lower indicated LOW risk for future opioid abuse  A score of 4 to 7 indicated moderate risk for future opioid abuse  A score of 8 or higher indicates a high risk for opioid abuse

## 2022-02-11 NOTE — ASU PATIENT PROFILE, ADULT - VISION (WITH CORRECTIVE LENSES IF THE PATIENT USUALLY WEARS THEM):
glasses 2 pair, reading and distance/Partially impaired: cannot see medication labels or newsprint, but can see obstacles in path, and the surrounding layout; can count fingers at arm's length

## 2022-02-11 NOTE — H&P PST ADULT - WEIGHT IN KG
Hatchet Flap Text: The defect edges were debeveled with a #15 scalpel blade.  Given the location of the defect, shape of the defect and the proximity to free margins a hatchet flap was deemed most appropriate.  Using a sterile surgical marker, an appropriate hatchet flap was drawn incorporating the defect and placing the expected incisions within the relaxed skin tension lines where possible.    The area thus outlined was incised deep to adipose tissue with a #15 scalpel blade.  The skin margins were undermined to an appropriate distance in all directions utilizing iris scissors. 89.6

## 2022-02-11 NOTE — H&P PST ADULT - HISTORY OF PRESENT ILLNESS
77y Male with history of HTN, glaucoma, and symptomatic atrial tachycardia presents to PST. Patient was seen in [facility] in 12/2021 for symptoms of dyspnea on exertion and chest tightness found to have an arrythmia of atrial tachycardia. He was noted to have salcos of atrial tachycardia so he was treated with AVN blockers, He underwent TTE which showed an LVEF of 45-50%. Stress testing revealed no perfusion abnormalities with and LVEF of 44%. He was starte don flecainide 100mg BID as well as Metoprolol 100mg BID and was discharged. He denies palpitations, chest pain, dizziness/lightheadedness or syncope.     Patient is scheduled for Atrial ablation with Dr. Dirk Johnson on 2/23/2022.       Echocardiogram (date): HELEN 12/2/2021 LEFT VENTRICLE: Left ventricular systolic function is low - normal. LVEF range is estimated at 50 % -55 %. IVS: Interventricular septal thickness is mildly increased. RIGHT VENTRICLE: The right ventricular global function is normal. Right ventricular systolic pressure is elevated at 40-50 mmHg. MITRAL VALVE: Mild mitral valve regurgitation. AORTIC VALVE: Mild aortic valve regurgitation. TRICUSPID VALVE: Mild tricuspid valve regurgitation. RVSP: 41 mmHg. The ejection fraction is 44 %  Stress Test (date): Nuclear stress test 12/7/2021 Regadenoson NST: LVEF 44%.  ml, ESV of 104 ml. There is an area or fixed defect in the apical cap that improves with prone imaging with no other significant perfusion defects noted. Overall the study appears to be normal without myocardial ischemia.   Cardiac CT or MRI (date):   Cardiac Cath (date):   Cardiac surgery (date):  77y Male with history of HTN, glaucoma, prostate CA s/p prostatectomy 2012 and symptomatic atrial tachycardia presents to PST. Patient was seen in [facility] in 12/2021 for symptoms of dyspnea on exertion and chest tightness found to have an arrythmia of atrial tachycardia. He was noted to have salcos of atrial tachycardia so he was treated with AVN blockers, He underwent TTE which showed an LVEF of 45-50%. Stress testing revealed no perfusion abnormalities with and LVEF of 44%. He was started on flecainide 100mg BID as well as Metoprolol 100mg BID and was discharged. Patient states he has been taking his medication as prescribed with no complications. He denies palpitations, chest pain, dizziness/lightheadedness or syncope. States he has been more tired in the afternoons. Patient is scheduled for SVT ablation/ ANES/ LEESA/ ABBOT with Dr. Dirk Johnson on 2/23/2022.     Echocardiogram (date): HELEN 12/2/2021 LEFT VENTRICLE: Left ventricular systolic function is low - normal. LVEF range is estimated at 50 % -55 %. IVS: Interventricular septal thickness is mildly increased. RIGHT VENTRICLE: The right ventricular global function is normal. Right ventricular systolic pressure is elevated at 40-50 mmHg. MITRAL VALVE: Mild mitral valve regurgitation. AORTIC VALVE: Mild aortic valve regurgitation. TRICUSPID VALVE: Mild tricuspid valve regurgitation. RVSP: 41 mmHg. The ejection fraction is 44 %  Stress Test (date): Nuclear stress test 12/7/2021 Regadenoson NST: LVEF 44%.  ml, ESV of 104 ml. There is an area or fixed defect in the apical cap that improves with prone imaging with no other significant perfusion defects noted. Overall the study appears to be normal without myocardial ischemia.   Cardiac CT or MRI (date):   Cardiac Cath (date):   Cardiac surgery (date):

## 2022-02-23 ENCOUNTER — OUTPATIENT (OUTPATIENT)
Dept: OUTPATIENT SERVICES | Facility: HOSPITAL | Age: 77
LOS: 1 days | End: 2022-02-23
Payer: MEDICARE

## 2022-02-23 ENCOUNTER — TRANSCRIPTION ENCOUNTER (OUTPATIENT)
Age: 77
End: 2022-02-23

## 2022-02-23 VITALS
SYSTOLIC BLOOD PRESSURE: 158 MMHG | TEMPERATURE: 98 F | DIASTOLIC BLOOD PRESSURE: 80 MMHG | RESPIRATION RATE: 14 BRPM | HEART RATE: 85 BPM | OXYGEN SATURATION: 97 %

## 2022-02-23 VITALS
DIASTOLIC BLOOD PRESSURE: 74 MMHG | RESPIRATION RATE: 16 BRPM | SYSTOLIC BLOOD PRESSURE: 168 MMHG | OXYGEN SATURATION: 96 % | HEART RATE: 82 BPM

## 2022-02-23 DIAGNOSIS — Z90.79 ACQUIRED ABSENCE OF OTHER GENITAL ORGAN(S): Chronic | ICD-10-CM

## 2022-02-23 DIAGNOSIS — Z98.890 OTHER SPECIFIED POSTPROCEDURAL STATES: Chronic | ICD-10-CM

## 2022-02-23 DIAGNOSIS — Z96.652 PRESENCE OF LEFT ARTIFICIAL KNEE JOINT: Chronic | ICD-10-CM

## 2022-02-23 DIAGNOSIS — I47.1 SUPRAVENTRICULAR TACHYCARDIA: ICD-10-CM

## 2022-02-23 LAB — ABO RH CONFIRMATION: SIGNIFICANT CHANGE UP

## 2022-02-23 PROCEDURE — C1731: CPT

## 2022-02-23 PROCEDURE — 93005 ELECTROCARDIOGRAM TRACING: CPT

## 2022-02-23 PROCEDURE — 93623 PRGRMD STIMJ&PACG IV RX NFS: CPT | Mod: 26

## 2022-02-23 PROCEDURE — 93653 COMPRE EP EVAL TX SVT: CPT

## 2022-02-23 PROCEDURE — 93623 PRGRMD STIMJ&PACG IV RX NFS: CPT

## 2022-02-23 PROCEDURE — C1732: CPT

## 2022-02-23 PROCEDURE — C1730: CPT

## 2022-02-23 PROCEDURE — 36415 COLL VENOUS BLD VENIPUNCTURE: CPT

## 2022-02-23 PROCEDURE — C1766: CPT

## 2022-02-23 PROCEDURE — 93010 ELECTROCARDIOGRAM REPORT: CPT

## 2022-02-23 RX ORDER — LEFLUNOMIDE 10 MG/1
1 TABLET ORAL
Qty: 0 | Refills: 0 | DISCHARGE

## 2022-02-23 RX ORDER — BENZOCAINE AND MENTHOL 5; 1 G/100ML; G/100ML
1 LIQUID ORAL
Refills: 0 | Status: DISCONTINUED | OUTPATIENT
Start: 2022-02-23 | End: 2022-03-09

## 2022-02-23 RX ORDER — METOPROLOL TARTRATE 50 MG
1 TABLET ORAL
Qty: 0 | Refills: 0 | DISCHARGE
Start: 2022-02-23

## 2022-02-23 RX ORDER — METOPROLOL TARTRATE 50 MG
100 TABLET ORAL DAILY
Refills: 0 | Status: DISCONTINUED | OUTPATIENT
Start: 2022-02-23 | End: 2022-03-09

## 2022-02-23 RX ORDER — DORZOLAMIDE HYDROCHLORIDE 20 MG/ML
1 SOLUTION/ DROPS OPHTHALMIC
Qty: 0 | Refills: 0 | DISCHARGE

## 2022-02-23 RX ORDER — FLECAINIDE ACETATE 50 MG
1 TABLET ORAL
Qty: 0 | Refills: 0 | DISCHARGE

## 2022-02-23 RX ORDER — CYCLOSPORINE 0.5 MG/ML
1 EMULSION OPHTHALMIC
Qty: 0 | Refills: 0 | DISCHARGE

## 2022-02-23 RX ORDER — ACETAMINOPHEN 500 MG
650 TABLET ORAL EVERY 6 HOURS
Refills: 0 | Status: DISCONTINUED | OUTPATIENT
Start: 2022-02-23 | End: 2022-03-09

## 2022-02-23 RX ORDER — LISINOPRIL 2.5 MG/1
40 TABLET ORAL DAILY
Refills: 0 | Status: DISCONTINUED | OUTPATIENT
Start: 2022-02-23 | End: 2022-03-09

## 2022-02-23 RX ORDER — ONDANSETRON 8 MG/1
4 TABLET, FILM COATED ORAL EVERY 8 HOURS
Refills: 0 | Status: DISCONTINUED | OUTPATIENT
Start: 2022-02-23 | End: 2022-03-09

## 2022-02-23 RX ORDER — ALPRAZOLAM 0.25 MG
0.25 TABLET ORAL EVERY 6 HOURS
Refills: 0 | Status: DISCONTINUED | OUTPATIENT
Start: 2022-02-23 | End: 2022-02-23

## 2022-02-23 RX ORDER — AMLODIPINE BESYLATE 2.5 MG/1
1 TABLET ORAL
Qty: 0 | Refills: 0 | DISCHARGE

## 2022-02-23 RX ORDER — METOPROLOL TARTRATE 50 MG
1 TABLET ORAL
Qty: 0 | Refills: 0 | DISCHARGE

## 2022-02-23 RX ORDER — AMLODIPINE BESYLATE 2.5 MG/1
2.5 TABLET ORAL DAILY
Refills: 0 | Status: DISCONTINUED | OUTPATIENT
Start: 2022-02-23 | End: 2022-03-09

## 2022-02-23 RX ORDER — LISINOPRIL 2.5 MG/1
1 TABLET ORAL
Qty: 0 | Refills: 0 | DISCHARGE

## 2022-02-23 RX ORDER — ZOLPIDEM TARTRATE 10 MG/1
1 TABLET ORAL
Qty: 0 | Refills: 0 | DISCHARGE

## 2022-02-23 NOTE — DISCHARGE NOTE PROVIDER - CARE PROVIDER_API CALL
Dirk Johnson)  Cardiac Electrophysiology; Cardiology  [address] [place] [address], [address]  [place] [address]  Phone: [phone]  Fax: [phone]  Follow Up Time:

## 2022-02-23 NOTE — PROGRESS NOTE ADULT - SUBJECTIVE AND OBJECTIVE BOX
Admission Criteria  Please admit the patient to the following service: CARDIOLOGY    Major Criteria:    - Continuous EKG monitoring is required for condition causing arrhythmia (hyperkalemia, etc)  - Significant volume load > 200 ml      Admit to: 3GUL     Patient is being admitted to the inpatient service due to high risk characteristics and need for further management/monitoring and is considered to be at a significantly increased risk of major adverse cardiac and vascular events if discharged.

## 2022-02-23 NOTE — DISCHARGE NOTE PROVIDER - NSDCMRMEDTOKEN_GEN_ALL_CORE_FT
amLODIPine 2.5 mg oral tablet: 1 tab(s) orally once a day  dorzolamide 2% ophthalmic solution: 1 drop(s) to each affected eye 3 times a day  flecainide 100 mg oral tablet: 1 tab(s) orally every 12 hours  leflunomide 10 mg oral tablet: 1 tab(s) orally once a day  lisinopril 40 mg oral tablet: 1 tab(s) orally once a day  metoprolol succinate 100 mg oral capsule, extended release: 1 cap(s) orally 2 times a day  Restasis 0.05% ophthalmic emulsion: 1 drop(s) to each affected eye every 12 hours  zolpidem 10 mg oral tablet: 1 tab(s) orally once a day (at bedtime), As Needed   amLODIPine 2.5 mg oral tablet: 1 tab(s) orally once a day  dorzolamide 2% ophthalmic solution: 1 drop(s) to each affected eye 3 times a day  leflunomide 10 mg oral tablet: 1 tab(s) orally once a day  lisinopril 40 mg oral tablet: 1 tab(s) orally once a day  metoprolol succinate 100 mg oral tablet, extended release: 1 tab(s) orally once a day  Restasis 0.05% ophthalmic emulsion: 1 drop(s) to each affected eye every 12 hours  zolpidem 10 mg oral tablet: 1 tab(s) orally once a day (at bedtime), As Needed

## 2022-02-23 NOTE — DISCHARGE NOTE PROVIDER - NSDCFUADDINST_GEN_ALL_CORE_FT
Our office will contact you in 3-5 days to schedule this appointment. Please call [phone] with questions or concerns. Our office will contact you in 3-5 days to schedule this appointment. Please call [phone] with questions or concerns.  Decrease your Metoprolol to 100mg ONCE daily.  Discontinue Flecainide.

## 2022-02-23 NOTE — PROGRESS NOTE ADULT - SUBJECTIVE AND OBJECTIVE BOX
Pt doing well s/p SVT ablation. Hemostatic sutures removed; sites C/D/I b/l; no bleeding or hematoma. Ambulated without difficulty. Access site precautions and activity limitations d/w patient who verbalizes understanding.

## 2022-02-23 NOTE — DISCHARGE NOTE PROVIDER - HOSPITAL COURSE
77y Male with history of HTN, glaucoma, prostate CA s/p prostatectomy 2012 and symptomatic atrial tachycardia.  He presented electively on 2/23/2022 for and is now status post uncomplicated radiofrequency ablation of atrial tachycardia.

## 2022-02-23 NOTE — DISCHARGE NOTE NURSING/CASE MANAGEMENT/SOCIAL WORK - PATIENT PORTAL LINK FT
You can access the FollowMyHealth Patient Portal offered by [facility] by registering at the following website: [email or web address]. By joining [facility]’s FollowMyHealth portal, you will also be able to view your health information using other applications (apps) compatible with our system.

## 2022-02-23 NOTE — CHART NOTE - NSCHARTNOTEFT_GEN_A_CORE
Patient arrived in fasting state in anticipation of EPS/RFA of SVT - Likely AT  Patient has held his Flecainide and Lopressor since Saturday as instructed.   Consent with physician

## 2022-02-23 NOTE — PROGRESS NOTE ADULT - SUBJECTIVE AND OBJECTIVE BOX
PROCEDURE(S): Radiofrequency Ablation of Supraventricular Tachycardia     ELECTRPOHYSIOLOGIST(S): Dirk Johnson MD         COMPLICATIONS:  none        DISPOSITION:  observation     CONDITION: stable      Pt doing well s/p focal AT ablation via b/l FV. Denies complaint.       MEDICATIONS  (STANDING):    MEDICATIONS  (PRN):  acetaminophen     Tablet .. 650 milliGRAM(s) Oral every 6 hours PRN Mild Pain (1 - 3)  ALPRAZolam 0.25 milliGRAM(s) Oral every 6 hours PRN anxiety/ insomnia  aluminum hydroxide/magnesium hydroxide/simethicone Suspension 30 milliLiter(s) Oral every 4 hours PRN Dyspepsia  benzocaine 15 mG/menthol 3.6 mG Lozenge 1 Lozenge Oral every 2 hours PRN Sore Throat  ondansetron Injectable 4 milliGRAM(s) IV Push every 8 hours PRN Nausea and/or Vomiting      Allergies  penicillin (Rash)      Exam:   HR: 92 (02-23-22 @ 12:15) (85 - 92)  BP: 130/77 (02-23-22 @ 12:15) (130/77 - 158/80)  RR: 16 (02-23-22 @ 12:15) (14 - 16)  SpO2: 93% (02-23-22 @ 12:15) (93% - 97%)    VSS, NAD, sleepy from anesthesia   Card: S1/S2, RRR, no m/g/r  Resp: lungs CTA b/l  Abd: S/NT/ND  Groins: hemostatic sutures in place; sites C/D/I; no bleeding, hematoma, erythema, exudate or edema  Ext: no edema; distal pulses intact    I/Os: net + 1081    ECG:    Assessment:   77y Male with history of HTN, glaucoma, prostate CA s/p prostatectomy 2012 and symptomatic atrial tachycardia.  He presented electively on 2/23/2022 for and is now status post uncomplicated radiofrequency ablation of atrial tachycardia.      Plan:   Bedrest x 4 hours, then OOB with assistance and progress as tolerated.   Groin sutures to be removed by EP service in AM.   Continue ***.    Discontinue ***.   Continue other home medications.   Strict I/Os.  Please encourage incentive spirometry and ambulation once able.  Observation and monitoring on telemetry overnight with anticipated discharge in the AM and outpt follow up in 2-4 weeks.  PROCEDURE(S): Radiofrequency Ablation of Supraventricular Tachycardia     ELECTRPOHYSIOLOGIST(S): Dirk Johnson MD         COMPLICATIONS:  none        DISPOSITION:  observation     CONDITION: stable      Pt doing well s/p focal AT ablation via b/l FV. Denies complaint.       MEDICATIONS  (STANDING):    MEDICATIONS  (PRN):  acetaminophen     Tablet .. 650 milliGRAM(s) Oral every 6 hours PRN Mild Pain (1 - 3)  ALPRAZolam 0.25 milliGRAM(s) Oral every 6 hours PRN anxiety/ insomnia  aluminum hydroxide/magnesium hydroxide/simethicone Suspension 30 milliLiter(s) Oral every 4 hours PRN Dyspepsia  benzocaine 15 mG/menthol 3.6 mG Lozenge 1 Lozenge Oral every 2 hours PRN Sore Throat  ondansetron Injectable 4 milliGRAM(s) IV Push every 8 hours PRN Nausea and/or Vomiting      Allergies  penicillin (Rash)      Exam:   HR: 92 (02-23-22 @ 12:15) (85 - 92)  BP: 130/77 (02-23-22 @ 12:15) (130/77 - 158/80)  RR: 16 (02-23-22 @ 12:15) (14 - 16)  SpO2: 93% (02-23-22 @ 12:15) (93% - 97%)    VSS, NAD, sleepy from anesthesia   Card: S1/S2, RRR, no m/g/r  Resp: lungs CTA b/l  Abd: S/NT/ND  Groins: hemostatic sutures in place; sites C/D/I; no bleeding, hematoma, erythema, exudate or edema  Ext: no edema; distal pulses intact    I/Os: net + 1081    ECG: Sinus rhythm at 87bpm, RBBB    Assessment:   77y Male with history of HTN, glaucoma, prostate CA s/p prostatectomy 2012 and symptomatic atrial tachycardia.  He presented electively on 2/23/2022 for and is now status post uncomplicated radiofrequency ablation of atrial tachycardia.      Plan:   Bedrest x 4 hours, then OOB with assistance and progress as tolerated.   Groin sutures to be removed by EP service in AM.   Decrease Metoprolol succinate to 100mg daily.   Discontinue Flecainide.   Continue other home medications.   Strict I/Os.  Please encourage incentive spirometry and ambulation once able.  Observation and monitoring on telemetry with anticipated discharge later today.  Outpt follow up in 2-4 weeks.

## 2022-02-24 ENCOUNTER — NON-APPOINTMENT (OUTPATIENT)
Age: 77
End: 2022-02-24

## 2022-04-18 PROBLEM — I47.1 SUPRAVENTRICULAR TACHYCARDIA: Chronic | Status: ACTIVE | Noted: 2022-02-11

## 2022-04-18 PROBLEM — H40.9 UNSPECIFIED GLAUCOMA: Chronic | Status: ACTIVE | Noted: 2022-02-11

## 2022-04-19 ENCOUNTER — APPOINTMENT (OUTPATIENT)
Dept: PHYSICAL MEDICINE AND REHAB | Facility: CLINIC | Age: 77
End: 2022-04-19
Payer: MEDICARE

## 2022-04-19 VITALS
HEART RATE: 81 BPM | DIASTOLIC BLOOD PRESSURE: 70 MMHG | TEMPERATURE: 97 F | SYSTOLIC BLOOD PRESSURE: 116 MMHG | RESPIRATION RATE: 16 BRPM | OXYGEN SATURATION: 97 %

## 2022-04-19 DIAGNOSIS — Z78.9 OTHER SPECIFIED HEALTH STATUS: ICD-10-CM

## 2022-04-19 DIAGNOSIS — G47.00 INSOMNIA, UNSPECIFIED: ICD-10-CM

## 2022-04-19 DIAGNOSIS — M19.90 UNSPECIFIED OSTEOARTHRITIS, UNSPECIFIED SITE: ICD-10-CM

## 2022-04-19 PROCEDURE — 99214 OFFICE O/P EST MOD 30 MIN: CPT

## 2022-04-19 RX ORDER — VITAMIN K2 90 MCG
125 MCG CAPSULE ORAL
Refills: 0 | Status: ACTIVE | COMMUNITY

## 2022-04-19 NOTE — PHYSICAL EXAM
[No Acute Distress] : no acute distress [Well Nourished] : well nourished [Well Developed] : well developed [Well-Appearing] : well-appearing [Normal Sclera/Conjunctiva] : normal sclera/conjunctiva [PERRL] : pupils equal round and reactive to light [EOMI] : extraocular movements intact [Normal Outer Ear/Nose] : the outer ears and nose were normal in appearance [Normal Oropharynx] : the oropharynx was normal [No JVD] : no jugular venous distention [No Lymphadenopathy] : no lymphadenopathy [Supple] : supple [Thyroid Normal, No Nodules] : the thyroid was normal and there were no nodules present [No Respiratory Distress] : no respiratory distress  [No Accessory Muscle Use] : no accessory muscle use [Clear to Auscultation] : lungs were clear to auscultation bilaterally [Normal Rate] : normal rate  [Regular Rhythm] : with a regular rhythm [Normal S1, S2] : normal S1 and S2 [No Carotid Bruits] : no carotid bruits [No Abdominal Bruit] : a ~M bruit was not heard ~T in the abdomen [No Varicosities] : no varicosities [Pedal Pulses Present] : the pedal pulses are present [No Edema] : there was no peripheral edema [No Palpable Aorta] : no palpable aorta [No Extremity Clubbing/Cyanosis] : no extremity clubbing/cyanosis [Soft] : abdomen soft [Non Tender] : non-tender [Non-distended] : non-distended [No Masses] : no abdominal mass palpated [No HSM] : no HSM [Normal Bowel Sounds] : normal bowel sounds [Soft, Nontender] : the abdomen was soft and nontender [Normal] : normal to percussion [None] : no hernias were palpable [Normal Posterior Cervical Nodes] : no posterior cervical lymphadenopathy [Normal Anterior Cervical Nodes] : no anterior cervical lymphadenopathy [No CVA Tenderness] : no CVA  tenderness [No Spinal Tenderness] : no spinal tenderness [No Joint Swelling] : no joint swelling [Grossly Normal Strength/Tone] : grossly normal strength/tone [No Rash] : no rash [Coordination Grossly Intact] : coordination grossly intact [No Focal Deficits] : no focal deficits [Normal Gait] : normal gait [Deep Tendon Reflexes (DTR)] : deep tendon reflexes were 2+ and symmetric [Normal Affect] : the affect was normal [Normal Insight/Judgement] : insight and judgment were intact [Epigastric] : not in the epigastric area [Periumbilical] : not periumbilical [Suprapubic] : not in the suprapubic area [RUQ] : not in the RUQ [RLQ] : not in the RLQ [LUQ] : not in the LUQ [LLQ] : not in the LLQ [Firm] : not firm [Rigid] : not rigid [Rebound] : no rebound [Guarding] : no guarding [Cardona's] : a negative Cardona's sign [Rovsing's] : a negative Rovsing's sign [Obturator] : a negative obturator sign [Psoas] : a negative psoas sign [de-identified] : murmur [de-identified] : negative Cardona's sign no abdominal tenderness on palpation, soft no palpable masses bowl sounds positive

## 2022-04-19 NOTE — HISTORY OF PRESENT ILLNESS
[FreeTextEntry1] : abdominal pain  [de-identified] : Patient presents in office today with abdominal pain. Patient states he feels a lot of pressure and discomfort when laying down but not when sitting up. Patient states he is having some back pain as well. Denies any associated nausea or vomiting.  No change in BM's.  Patient states his last colonoscopy was in 2019. Denies any CP, SOB or diff breathing. No recent fever, chills, cough or cold type symptoms. Has no other complaints at this time.

## 2022-04-19 NOTE — PLAN
[FreeTextEntry1] : Continue with present medications.\par \par increase fluids\par \par Follow with GI as discussed.\par \par US of abdomen ordered today. \par \par follow BLAND diet until gastroenterologist consultation.\par \par At least 30 minutes was spent with patient face to face examining and reviewing findings/results during this visit. Ample time was provided to answer any questions or address concerns to the patients satisfaction..\par \par I, Kael Nguyen , attest that this documentation has been prepared under the direction and in the presence of Provider Dmitriy Devine DNP\par \par The documentation recorded by the scribe, in my presence, accurately reflects the service I personally performed, and the decisions made by me with my edits as appropriate.\par Dmitriy Devine DNP\par \par

## 2022-04-26 ENCOUNTER — RESULT REVIEW (OUTPATIENT)
Age: 77
End: 2022-04-26

## 2022-09-13 ENCOUNTER — APPOINTMENT (OUTPATIENT)
Dept: PHYSICAL MEDICINE AND REHAB | Facility: CLINIC | Age: 77
End: 2022-09-13

## 2022-09-13 VITALS
WEIGHT: 189 LBS | SYSTOLIC BLOOD PRESSURE: 122 MMHG | TEMPERATURE: 97.4 F | RESPIRATION RATE: 16 BRPM | HEART RATE: 80 BPM | HEIGHT: 67 IN | OXYGEN SATURATION: 98 % | BODY MASS INDEX: 29.66 KG/M2 | DIASTOLIC BLOOD PRESSURE: 80 MMHG

## 2022-09-13 PROCEDURE — 99213 OFFICE O/P EST LOW 20 MIN: CPT

## 2022-09-13 NOTE — REVIEW OF SYSTEMS
[Postnasal Drip] : postnasal drip [Nasal Discharge] : nasal discharge [Sore Throat] : sore throat [Hoarseness] : hoarseness [Cough] : cough [Negative] : Heme/Lymph

## 2022-09-13 NOTE — PHYSICAL EXAM
[No Acute Distress] : no acute distress [Well Nourished] : well nourished [Well Developed] : well developed [Well-Appearing] : well-appearing [Normal Sclera/Conjunctiva] : normal sclera/conjunctiva [PERRL] : pupils equal round and reactive to light [EOMI] : extraocular movements intact [Normal Outer Ear/Nose] : the outer ears and nose were normal in appearance [No JVD] : no jugular venous distention [No Lymphadenopathy] : no lymphadenopathy [Supple] : supple [Thyroid Normal, No Nodules] : the thyroid was normal and there were no nodules present [No Respiratory Distress] : no respiratory distress  [No Accessory Muscle Use] : no accessory muscle use [Clear to Auscultation] : lungs were clear to auscultation bilaterally [Normal Rate] : normal rate  [Regular Rhythm] : with a regular rhythm [Normal S1, S2] : normal S1 and S2 [No Murmur] : no murmur heard [No Carotid Bruits] : no carotid bruits [No Abdominal Bruit] : a ~M bruit was not heard ~T in the abdomen [No Varicosities] : no varicosities [Pedal Pulses Present] : the pedal pulses are present [No Edema] : there was no peripheral edema [No Palpable Aorta] : no palpable aorta [No Extremity Clubbing/Cyanosis] : no extremity clubbing/cyanosis [Soft] : abdomen soft [Non Tender] : non-tender [Non-distended] : non-distended [No Masses] : no abdominal mass palpated [No HSM] : no HSM [Normal Bowel Sounds] : normal bowel sounds [Normal Posterior Cervical Nodes] : no posterior cervical lymphadenopathy [Normal Anterior Cervical Nodes] : no anterior cervical lymphadenopathy [No CVA Tenderness] : no CVA  tenderness [No Spinal Tenderness] : no spinal tenderness [No Joint Swelling] : no joint swelling [Grossly Normal Strength/Tone] : grossly normal strength/tone [No Rash] : no rash [Coordination Grossly Intact] : coordination grossly intact [No Focal Deficits] : no focal deficits [Normal Gait] : normal gait [Deep Tendon Reflexes (DTR)] : deep tendon reflexes were 2+ and symmetric [Normal Affect] : the affect was normal [Normal Insight/Judgement] : insight and judgment were intact [de-identified] : + PND, TM bulging bilat

## 2022-09-13 NOTE — HISTORY OF PRESENT ILLNESS
[FreeTextEntry1] : congestion [de-identified] : Patient presents today for further evaluation of cough and congestion for the past month.  States their symptoms have been the same worse despite the use of OTC meds.  denies any CP, SOB or diff breathing.  No recorded fever or chills.  States symptoms worsen with exertion, no alleviating factors.  Has no other complaints at this time.

## 2022-09-13 NOTE — PLAN
[FreeTextEntry1] : Start medication as prescribed.\par Patient to continue with present medications. \par Increase fluid intake.. \par RTO in 7-10 days for re-eval.. \par At least 20 minutes was spent with patient face to face examining and reviewing findings/results during this visit.  Ample time was provided to answer any questions or address concerns to the patients satisfaction..\par

## 2022-09-18 ENCOUNTER — FORM ENCOUNTER (OUTPATIENT)
Age: 77
End: 2022-09-18

## 2022-09-28 ENCOUNTER — FORM ENCOUNTER (OUTPATIENT)
Age: 77
End: 2022-09-28

## 2022-10-05 ENCOUNTER — RX RENEWAL (OUTPATIENT)
Age: 77
End: 2022-10-05

## 2022-10-18 ENCOUNTER — APPOINTMENT (OUTPATIENT)
Dept: PHYSICAL MEDICINE AND REHAB | Facility: CLINIC | Age: 77
End: 2022-10-18

## 2022-10-18 VITALS
TEMPERATURE: 97.7 F | HEIGHT: 67 IN | DIASTOLIC BLOOD PRESSURE: 80 MMHG | SYSTOLIC BLOOD PRESSURE: 126 MMHG | HEART RATE: 79 BPM | BODY MASS INDEX: 29.66 KG/M2 | WEIGHT: 189 LBS | RESPIRATION RATE: 16 BRPM | OXYGEN SATURATION: 98 %

## 2022-10-18 PROCEDURE — 99213 OFFICE O/P EST LOW 20 MIN: CPT

## 2022-10-18 NOTE — HISTORY OF PRESENT ILLNESS
[FreeTextEntry1] : Allergies [de-identified] : Patient presents today for repeat evaluation on seasonal allergies. States he has been doing well. Denies any CP, SOB or diff breathing. No recent fever, chills, cough or cold type symptoms.needs a refill on flonase.   Has no other complaints at this time.

## 2022-10-18 NOTE — PLAN
[FreeTextEntry1] : Patient to continue with present medications. \par Increase fluid intake.. \par RTO in 3 months for re-eval.. \par Patient to follow with allergist as scheduled.\par At least 20 minutes was spent with patient face to face examining and reviewing findings/results during this visit.  Ample time was provided to answer any questions or address concerns to the patients satisfaction..\par 	\par

## 2022-10-30 ENCOUNTER — FORM ENCOUNTER (OUTPATIENT)
Age: 77
End: 2022-10-30

## 2023-01-03 ENCOUNTER — FORM ENCOUNTER (OUTPATIENT)
Age: 78
End: 2023-01-03

## 2023-01-10 ENCOUNTER — APPOINTMENT (OUTPATIENT)
Dept: PHYSICAL MEDICINE AND REHAB | Facility: CLINIC | Age: 78
End: 2023-01-10
Payer: MEDICARE

## 2023-01-10 ENCOUNTER — RESULT REVIEW (OUTPATIENT)
Age: 78
End: 2023-01-10

## 2023-01-10 VITALS
RESPIRATION RATE: 16 BRPM | HEART RATE: 80 BPM | SYSTOLIC BLOOD PRESSURE: 116 MMHG | TEMPERATURE: 97.5 F | OXYGEN SATURATION: 97 % | DIASTOLIC BLOOD PRESSURE: 72 MMHG

## 2023-01-10 DIAGNOSIS — U07.1 COVID-19: ICD-10-CM

## 2023-01-10 PROCEDURE — 99213 OFFICE O/P EST LOW 20 MIN: CPT | Mod: CS

## 2023-01-10 RX ORDER — CLINDAMYCIN HYDROCHLORIDE 150 MG/1
150 CAPSULE ORAL
Refills: 0 | Status: DISCONTINUED | COMMUNITY
End: 2023-01-10

## 2023-01-10 NOTE — PLAN
[FreeTextEntry1] : \par Sent for CXR.\par Prescribed Zpack.\par Instructed patient to take children's liquid Benadryl and Coricidin HBP. \par \par Discussed and reviewed current medications as follows; \par Amlodipine, Metoprolol, and Diltiazem for HTN, Lisinopril for HLD\par \par Patient to continue with present medications. \par Increase fluid intake.\par RTO in 7-10 days for re-evaluation.\par At least 20 minutes was spent with patient face to face examining and reviewing findings/results during this visit. Ample time was provided to answer any questions or address concerns to the patients satisfaction.\par \par I, Samira Tejeda, attest that this documentation has been prepared under the direction and in the presence of Provider Dmitriy Devine DNP\par \par The documentation recorded by the scribe, in my presence, accurately reflects the service I personally performed, and the decisions made by me with my edits as appropriate.\par Dmitriy Devine DNP\par

## 2023-01-10 NOTE — PHYSICAL EXAM
[No Acute Distress] : no acute distress [Well Nourished] : well nourished [Well Developed] : well developed [Well-Appearing] : well-appearing [Normal Sclera/Conjunctiva] : normal sclera/conjunctiva [PERRL] : pupils equal round and reactive to light [EOMI] : extraocular movements intact [Normal Outer Ear/Nose] : the outer ears and nose were normal in appearance [Normal Oropharynx] : the oropharynx was normal [No JVD] : no jugular venous distention [No Lymphadenopathy] : no lymphadenopathy [Supple] : supple [Thyroid Normal, No Nodules] : the thyroid was normal and there were no nodules present [No Respiratory Distress] : no respiratory distress  [No Accessory Muscle Use] : no accessory muscle use [Normal Rate] : normal rate  [Regular Rhythm] : with a regular rhythm [Normal S1, S2] : normal S1 and S2 [No Murmur] : no murmur heard [No Carotid Bruits] : no carotid bruits [No Abdominal Bruit] : a ~M bruit was not heard ~T in the abdomen [No Varicosities] : no varicosities [Pedal Pulses Present] : the pedal pulses are present [No Edema] : there was no peripheral edema [No Palpable Aorta] : no palpable aorta [No Extremity Clubbing/Cyanosis] : no extremity clubbing/cyanosis [Soft] : abdomen soft [Non Tender] : non-tender [Non-distended] : non-distended [No Masses] : no abdominal mass palpated [No HSM] : no HSM [Normal Bowel Sounds] : normal bowel sounds [Normal Posterior Cervical Nodes] : no posterior cervical lymphadenopathy [Normal Anterior Cervical Nodes] : no anterior cervical lymphadenopathy [No CVA Tenderness] : no CVA  tenderness [No Spinal Tenderness] : no spinal tenderness [No Joint Swelling] : no joint swelling [Grossly Normal Strength/Tone] : grossly normal strength/tone [No Rash] : no rash [Coordination Grossly Intact] : coordination grossly intact [No Focal Deficits] : no focal deficits [Normal Gait] : normal gait [Deep Tendon Reflexes (DTR)] : deep tendon reflexes were 2+ and symmetric [Normal Affect] : the affect was normal [Normal Insight/Judgement] : insight and judgment were intact [de-identified] : diminished breath sounds left base, scattered wheeze

## 2023-03-15 ENCOUNTER — FORM ENCOUNTER (OUTPATIENT)
Age: 78
End: 2023-03-15

## 2023-03-18 ENCOUNTER — RX RENEWAL (OUTPATIENT)
Age: 78
End: 2023-03-18

## 2023-03-23 ENCOUNTER — FORM ENCOUNTER (OUTPATIENT)
Age: 78
End: 2023-03-23

## 2023-03-26 ENCOUNTER — FORM ENCOUNTER (OUTPATIENT)
Age: 78
End: 2023-03-26

## 2023-04-04 ENCOUNTER — RX RENEWAL (OUTPATIENT)
Age: 78
End: 2023-04-04

## 2023-05-02 ENCOUNTER — APPOINTMENT (OUTPATIENT)
Dept: PHYSICAL MEDICINE AND REHAB | Facility: CLINIC | Age: 78
End: 2023-05-02
Payer: MEDICARE

## 2023-05-02 ENCOUNTER — NON-APPOINTMENT (OUTPATIENT)
Age: 78
End: 2023-05-02

## 2023-05-02 ENCOUNTER — LABORATORY RESULT (OUTPATIENT)
Age: 78
End: 2023-05-02

## 2023-05-02 VITALS
DIASTOLIC BLOOD PRESSURE: 86 MMHG | OXYGEN SATURATION: 99 % | BODY MASS INDEX: 29.66 KG/M2 | RESPIRATION RATE: 16 BRPM | SYSTOLIC BLOOD PRESSURE: 130 MMHG | TEMPERATURE: 97.9 F | HEART RATE: 79 BPM | HEIGHT: 67 IN | WEIGHT: 189 LBS

## 2023-05-02 DIAGNOSIS — M17.9 OSTEOARTHRITIS OF KNEE, UNSPECIFIED: ICD-10-CM

## 2023-05-02 DIAGNOSIS — Z82.49 FAMILY HISTORY OF ISCHEMIC HEART DISEASE AND OTHER DISEASES OF THE CIRCULATORY SYSTEM: ICD-10-CM

## 2023-05-02 DIAGNOSIS — R10.9 UNSPECIFIED ABDOMINAL PAIN: ICD-10-CM

## 2023-05-02 PROCEDURE — G0446: CPT

## 2023-05-02 PROCEDURE — 36410 VNPNXR 3YR/> PHY/QHP DX/THER: CPT

## 2023-05-02 PROCEDURE — G0439: CPT

## 2023-05-02 PROCEDURE — 81003 URINALYSIS AUTO W/O SCOPE: CPT | Mod: QW

## 2023-05-02 PROCEDURE — 93000 ELECTROCARDIOGRAM COMPLETE: CPT

## 2023-05-02 RX ORDER — METOPROLOL TARTRATE 100 MG/1
100 TABLET, FILM COATED ORAL DAILY
Qty: 90 | Refills: 0 | Status: ACTIVE | COMMUNITY

## 2023-05-02 RX ORDER — AMLODIPINE BESYLATE 2.5 MG/1
2.5 TABLET ORAL
Refills: 0 | Status: DISCONTINUED | COMMUNITY
End: 2023-05-02

## 2023-05-02 RX ORDER — DILTIAZEM HYDROCHLORIDE 120 MG/1
120 CAPSULE, EXTENDED RELEASE ORAL
Refills: 0 | Status: DISCONTINUED | COMMUNITY
End: 2023-05-02

## 2023-05-02 RX ORDER — ACETAMINOPHEN 160 MG/5ML
250 LIQUID ORAL
Refills: 0 | Status: DISCONTINUED | COMMUNITY
End: 2023-05-02

## 2023-05-02 RX ORDER — AZITHROMYCIN 250 MG/1
250 TABLET, FILM COATED ORAL
Qty: 1 | Refills: 0 | Status: DISCONTINUED | COMMUNITY
Start: 2023-01-10 | End: 2023-05-02

## 2023-05-02 RX ORDER — METOPROLOL SUCCINATE 25 MG/1
25 TABLET, EXTENDED RELEASE ORAL
Refills: 0 | Status: DISCONTINUED | COMMUNITY
End: 2023-05-02

## 2023-05-02 RX ORDER — LEFLUNOMIDE 20 MG/1
20 TABLET, FILM COATED ORAL
Refills: 0 | Status: DISCONTINUED | COMMUNITY
End: 2023-05-02

## 2023-05-02 RX ORDER — FLUTICASONE PROPIONATE 50 MCG
50 SPRAY, SUSPENSION NASAL
Refills: 0 | Status: ACTIVE | COMMUNITY

## 2023-05-02 RX ORDER — LISINOPRIL 40 MG/1
40 TABLET ORAL DAILY
Qty: 90 | Refills: 1 | Status: ACTIVE | COMMUNITY

## 2023-05-02 RX ORDER — LEVOCETIRIZINE DIHYDROCHLORIDE 5 MG/1
5 TABLET, FILM COATED ORAL
Refills: 0 | Status: ACTIVE | COMMUNITY

## 2023-05-02 RX ORDER — FLUTICASONE PROPIONATE 50 UG/1
50 SPRAY, METERED NASAL TWICE DAILY
Qty: 1 | Refills: 1 | Status: DISCONTINUED | COMMUNITY
Start: 2022-09-13 | End: 2023-05-02

## 2023-05-02 RX ORDER — MECOBALAMIN 5000 MCG
LOZENGE ORAL
Refills: 0 | Status: ACTIVE | COMMUNITY

## 2023-05-02 RX ORDER — DORZOLAMIDE HYDROCHLORIDE TIMOLOL MALEATE 20; 5 MG/ML; MG/ML
22.3-6.8 SOLUTION/ DROPS OPHTHALMIC TWICE DAILY
Refills: 0 | Status: ACTIVE | COMMUNITY

## 2023-05-02 RX ORDER — MELOXICAM 15 MG/1
15 TABLET ORAL
Refills: 0 | Status: DISCONTINUED | COMMUNITY
End: 2023-05-02

## 2023-05-02 RX ORDER — ZINC SULFATE 50(220)MG
CAPSULE ORAL
Refills: 0 | Status: ACTIVE | COMMUNITY

## 2023-05-02 RX ORDER — ZOLPIDEM TARTRATE 10 MG/1
10 TABLET ORAL
Refills: 0 | Status: DISCONTINUED | COMMUNITY
End: 2023-05-02

## 2023-05-02 RX ORDER — CYCLOSPORINE 0.5 MG/ML
0.05 EMULSION OPHTHALMIC
Refills: 0 | Status: ACTIVE | COMMUNITY

## 2023-05-02 RX ORDER — METOPROLOL TARTRATE 50 MG/1
50 TABLET, FILM COATED ORAL
Refills: 0 | Status: DISCONTINUED | COMMUNITY
End: 2023-05-02

## 2023-05-02 RX ORDER — LEVOCETIRIZINE DIHYDROCHLORIDE 5 MG/1
5 TABLET ORAL DAILY
Qty: 90 | Refills: 3 | Status: DISCONTINUED | COMMUNITY
Start: 2022-09-13 | End: 2023-05-02

## 2023-05-02 RX ORDER — GABAPENTIN 300 MG/1
300 CAPSULE ORAL
Refills: 0 | Status: DISCONTINUED | COMMUNITY
End: 2023-05-02

## 2023-05-02 RX ORDER — DORZOLAMIDE HYDROCHLORIDE 20 MG/ML
2 SOLUTION OPHTHALMIC
Refills: 0 | Status: DISCONTINUED | COMMUNITY
End: 2023-05-02

## 2023-05-02 RX ORDER — CHROMIUM 200 MCG
TABLET ORAL
Refills: 0 | Status: DISCONTINUED | COMMUNITY
End: 2023-05-02

## 2023-05-02 RX ORDER — MULTIVIT-MIN/IRON/FOLIC ACID/K 18-600-40
400 CAPSULE ORAL
Refills: 0 | Status: ACTIVE | COMMUNITY

## 2023-05-02 NOTE — PLAN
[FreeTextEntry1] : \par Labs Drawn by Dr. Dmitriy Devine due to poor venous access.  Patient required lab testing due to conditions in their past medical history requiring periodic monitoring.  Labs were sent to [facility].\par \par EKG - NSR - 70 , SOLANGE - 0.180 , No acute T wave changes noted..\par \par Discussed and reviewed medications with patient as follows;\par Diltiazem - SVT, Lipitor - HLD, Metoprolol - HTN, Flonase and Xyzal - seasonal allergies\par Patient to continue with present medications - all medications reconciled/reviewed during this visit and listed above. \par Increase fluid intake. \par RTO in 7-10 days for re-evaluation.\par \par  Cardiovascular risks have been assessed and reviewed with this patient during this visit and discussed for at least 10 minutes.  Based off this patients assessment, recommendations have been individually created and reviewed.  Patient clearly states their understanding and will make changes to their current lifestyle as discussed\par \par \par I, Samira Tejeda, attest that this documentation has been prepared under the direction and in the presence of Provider Dmitriy Devine DNP\par \par The documentation recorded by the scribe, in my presence, accurately reflects the service I personally performed, and the decisions made by me with my edits as appropriate.\par Dmitriy Devine DNP\par

## 2023-05-02 NOTE — HISTORY OF PRESENT ILLNESS
[FreeTextEntry1] : annual wellness visit\par  [de-identified] : Patient presents today for physical exam. His last PE was over a year ago and since that time he has not had any significant changes to his medical history. He reports following with urology for hematuria over the last 2 months. He denies lower abdominal discomfort. He also reports following with general surgery (Dr. Lynch) for hiatal hernia. Pt reports associated increasing abdominal discomfort. Denies any CP, SOB or diff breathing. Denies abdominal pain, blood in stool, or changes in bowel habits. No recent fever, chills, cough or cold type symptoms. Has no other complaints at this time.\par \par

## 2023-05-03 LAB
25(OH)D3 SERPL-MCNC: 49.1 NG/ML
ALBUMIN SERPL ELPH-MCNC: 4.5 G/DL
ALP BLD-CCNC: 65 U/L
ALT SERPL-CCNC: 24 U/L
ANION GAP SERPL CALC-SCNC: 12 MMOL/L
AST SERPL-CCNC: 20 U/L
BILIRUB SERPL-MCNC: 0.3 MG/DL
BILIRUB UR QL STRIP: NEGATIVE
BUN SERPL-MCNC: 16 MG/DL
CALCIUM SERPL-MCNC: 10.6 MG/DL
CHLORIDE SERPL-SCNC: 106 MMOL/L
CHOLEST SERPL-MCNC: 154 MG/DL
CO2 SERPL-SCNC: 25 MMOL/L
COLLECTION METHOD: NORMAL
CREAT SERPL-MCNC: 1 MG/DL
EGFR: 78 ML/MIN/1.73M2
ESTIMATED AVERAGE GLUCOSE: 126 MG/DL
FERRITIN SERPL-MCNC: 56 NG/ML
FOLATE SERPL-MCNC: 18.4 NG/ML
GLUCOSE SERPL-MCNC: 110 MG/DL
GLUCOSE UR-MCNC: NEGATIVE
HBA1C MFR BLD HPLC: 6 %
HCG UR QL: 0.2 EU/DL
HDLC SERPL-MCNC: 49 MG/DL
HGB UR QL STRIP.AUTO: NORMAL
IRON SATN MFR SERPL: 22 %
IRON SERPL-MCNC: 109 UG/DL
KETONES UR-MCNC: NEGATIVE
LDLC SERPL CALC-MCNC: 83 MG/DL
LEUKOCYTE ESTERASE UR QL STRIP: NEGATIVE
MAGNESIUM SERPL-MCNC: 2.1 MG/DL
NITRITE UR QL STRIP: NEGATIVE
NONHDLC SERPL-MCNC: 105 MG/DL
PH UR STRIP: 7
POTASSIUM SERPL-SCNC: 4.9 MMOL/L
PROT SERPL-MCNC: 6.7 G/DL
PROT UR STRIP-MCNC: NORMAL
PSA SERPL-MCNC: 0.27 NG/ML
SODIUM SERPL-SCNC: 143 MMOL/L
SP GR UR STRIP: 1.02
T3 SERPL-MCNC: 173 NG/DL
T3RU NFR SERPL: 1.3 TBI
T4 FREE SERPL-MCNC: 0.8 NG/DL
TESTOST SERPL-MCNC: 407 NG/DL
TIBC SERPL-MCNC: 487 UG/DL
TRIGL SERPL-MCNC: 109 MG/DL
TSH SERPL-ACNC: 1.91 UIU/ML
UIBC SERPL-MCNC: 378 UG/DL
VIT B12 SERPL-MCNC: 499 PG/ML

## 2023-05-11 ENCOUNTER — APPOINTMENT (OUTPATIENT)
Dept: PHYSICAL MEDICINE AND REHAB | Facility: CLINIC | Age: 78
End: 2023-05-11
Payer: MEDICARE

## 2023-05-11 VITALS — HEIGHT: 67 IN | BODY MASS INDEX: 29.66 KG/M2 | WEIGHT: 189 LBS

## 2023-05-11 PROCEDURE — 99213 OFFICE O/P EST LOW 20 MIN: CPT | Mod: 95

## 2023-05-11 NOTE — HISTORY OF PRESENT ILLNESS
[FreeTextEntry1] : Cholesterol [de-identified] : Patient encounter today for re-evaluation of cholesterol.  States he has been doing well.  Denies any CP, SOB or diff breathing.  No recent fever, chills, cough or cold type symptoms.  Has no other complaints at this time.\par \par

## 2023-05-30 LAB
B BURGDOR AB SER-IMP: NEGATIVE
B BURGDOR IGG+IGM SER QL: 0.03 INDEX
BASOPHILS # BLD AUTO: 0.11 K/UL
BASOPHILS NFR BLD AUTO: 2.5 %
EOSINOPHIL # BLD AUTO: 0.15 K/UL
EOSINOPHIL NFR BLD AUTO: 3.4 %
HCT VFR BLD CALC: 44.7 %
HGB BLD-MCNC: 14.3 G/DL
LYMPHOCYTES # BLD AUTO: 1.57 K/UL
LYMPHOCYTES NFR BLD AUTO: 34.8 %
MAN DIFF?: NORMAL
MCHC RBC-ENTMCNC: 30.4 PG
MCHC RBC-ENTMCNC: 32 GM/DL
MCV RBC AUTO: 94.9 FL
MONOCYTES # BLD AUTO: 0.99 K/UL
MONOCYTES NFR BLD AUTO: 22 %
NEUTROPHILS # BLD AUTO: 1.68 K/UL
NEUTROPHILS NFR BLD AUTO: 37.3 %
PLATELET # BLD AUTO: 182 K/UL
RBC # BLD: 4.71 M/UL
RBC # FLD: 14.4 %
WBC # FLD AUTO: 4.5 K/UL

## 2023-06-19 ENCOUNTER — RX ONLY (RX ONLY)
Age: 78
End: 2023-06-19

## 2023-07-11 NOTE — H&P PST ADULT - ENMT COMMENTS
[address]  1979 07/11/23    SUBJECTIVE:    Pt with a new Dx of DM2. OBJECTIVE:    /76   Pulse 70   Resp 16   SpO2 97%     Physical Exam    ASSESSMENT:    1. Controlled type 2 diabetes mellitus without complication, without long-term current use of insulin ([address])        PLAN:    Gabriella Torres was seen today for discuss labs. Diagnoses and all orders for this visit:    Controlled type 2 diabetes mellitus without complication, without long-term current use of insulin ([address]) - discussed pathophys, diagnosis, and monitoring of DM. Discussed complication. Will start ASA, lipitor, and metformin. Will also refer to Abigail Justin for DM teaching  -     [facility] (Ambulatory)  -     metFORMIN (GLUCOPHAGE) 500 MG tablet; Take 1 tablet by mouth daily (with breakfast)  -     atorvastatin (LIPITOR) 40 MG tablet;  Take 1 tablet by mouth daily
[place], has hearing aids does not use them

## 2023-07-28 ENCOUNTER — OFFICE (OUTPATIENT)
Facility: LOCATION | Age: 78
Setting detail: OPHTHALMOLOGY
End: 2023-07-28
Payer: MEDICARE

## 2023-07-28 DIAGNOSIS — H16.223: ICD-10-CM

## 2023-07-28 DIAGNOSIS — H40.1111: ICD-10-CM

## 2023-07-28 DIAGNOSIS — H35.033: ICD-10-CM

## 2023-07-28 DIAGNOSIS — H40.1122: ICD-10-CM

## 2023-07-28 DIAGNOSIS — H43.393: ICD-10-CM

## 2023-07-28 PROBLEM — Z96.1 PSEUDOPHAKIA ; BOTH EYES: Status: ACTIVE | Noted: 2023-07-28

## 2023-07-28 PROCEDURE — 92133 CPTRZD OPH DX IMG PST SGM ON: CPT | Performed by: OPHTHALMOLOGY

## 2023-07-28 PROCEDURE — 92014 COMPRE OPH EXAM EST PT 1/>: CPT | Performed by: OPHTHALMOLOGY

## 2023-07-28 ASSESSMENT — SUPERFICIAL PUNCTATE KERATITIS (SPK)
OS_SPK: T
OD_SPK: T

## 2023-07-28 ASSESSMENT — CONFRONTATIONAL VISUAL FIELD TEST (CVF)
OD_FINDINGS: FULL
OS_FINDINGS: FULL

## 2023-07-28 ASSESSMENT — TONOMETRY
OD_IOP_MMHG: 20
OS_IOP_MMHG: 19

## 2023-07-28 ASSESSMENT — LID EXAM ASSESSMENTS
OD_BLEPHARITIS: 1+
OS_BLEPHARITIS: 1+

## 2023-08-04 ASSESSMENT — REFRACTION_CURRENTRX
OS_OVR_VA: 20/
OS_SPHERE: +2.00
OD_OVR_VA: 20/
OS_AXIS: 30
OD_ADD: +2.50
OD_CYLINDER: +0.75
OS_SPHERE: -0.75
OS_CYLINDER: +1.50
OD_SPHERE: PLANO
OS_ADD: +2.50
OD_AXIS: 5
OD_SPHERE: +2.00
OD_OVR_VA: 20/
OS_OVR_VA: 20/

## 2023-08-04 ASSESSMENT — REFRACTION_AUTOREFRACTION
OD_AXIS: 160
OD_SPHERE: -1.25
OS_CYLINDER: +0.75
OS_SPHERE: -1.25
OD_CYLINDER: +0.50
OS_AXIS: 69

## 2023-08-04 ASSESSMENT — VISUAL ACUITY
OD_BCVA: 20/20-2
OS_BCVA: 20/25+2

## 2023-08-04 ASSESSMENT — SPHEQUIV_DERIVED
OD_SPHEQUIV: -1
OS_SPHEQUIV: -0.875

## 2023-08-21 ENCOUNTER — APPOINTMENT (OUTPATIENT)
Dept: PHYSICAL MEDICINE AND REHAB | Facility: CLINIC | Age: 78
End: 2023-08-21
Payer: MEDICARE

## 2023-08-21 DIAGNOSIS — L03.90 CELLULITIS, UNSPECIFIED: ICD-10-CM

## 2023-08-21 DIAGNOSIS — J30.2 OTHER SEASONAL ALLERGIC RHINITIS: ICD-10-CM

## 2023-08-21 PROCEDURE — 99213 OFFICE O/P EST LOW 20 MIN: CPT

## 2023-08-21 RX ORDER — DOXYCYCLINE 100 MG/1
100 TABLET, FILM COATED ORAL TWICE DAILY
Qty: 28 | Refills: 0 | Status: COMPLETED | COMMUNITY
Start: 2023-08-21 | End: 2023-09-04

## 2023-08-21 RX ORDER — TAMSULOSIN HYDROCHLORIDE 0.4 MG/1
0.4 CAPSULE ORAL
Refills: 0 | Status: ACTIVE | COMMUNITY

## 2023-08-21 RX ORDER — DILTIAZEM HYDROCHLORIDE 120 MG/1
120 CAPSULE, EXTENDED RELEASE ORAL
Qty: 90 | Refills: 1 | Status: ACTIVE | COMMUNITY
Start: 2022-10-05 | End: 1900-01-01

## 2023-08-21 NOTE — HISTORY OF PRESENT ILLNESS
[FreeTextEntry1] : Tick bite [de-identified] : Patient encounter today for evaluation of tick bite to right flank 2 months ago. Pt states the area has been increasingly itchy and red.  States he has been doing well otherwise.  Reports following with Dr. Lynch for hiatal hernia repair. States he has had intermittent associated abd discomfort. Pt also reports finishing radiation therapy (40 treatments) and started Flomax. Denies any CP, SOB or diff breathing.  No recent fever, chills, cough or cold type symptoms.  Has no other complaints at this time.

## 2023-08-21 NOTE — PLAN
[FreeTextEntry1] :  Doxycycline prescribed for infected tick bite. Advised warm compresses 4-5 times per day for 1 week. Advised Neosporin use for the next 2 days. Discussed and reviewed current medications as follows;  Diltiazem - SVT, Lipitor - HLD, Metoprolol - HTN, Flonase and Xyzal - seasonal allergies  Patient to continue with present medications - all medications reconciled/reviewed during this visit and listed above.   Increase fluid intake. RTO in 7-10 days for re-evaluation. At least 20 minutes was spent with patient face to face examining and reviewing findings/results during this visit. Ample time was provided to answer any questions or address concerns to the patients satisfaction. I, Samira Tejeda, attest that this documentation has been prepared under the direction and in the presence of Provider Dmitriy Devine DNP The documentation recorded by the scribe, in my presence, accurately reflects the service I personally performed, and the decisions made by me with my edits as appropriate. Dmitriy Devine DNP

## 2023-08-21 NOTE — PHYSICAL EXAM
[No Acute Distress] : no acute distress [Well Nourished] : well nourished [Well Developed] : well developed [Well-Appearing] : well-appearing [Normal Sclera/Conjunctiva] : normal sclera/conjunctiva [PERRL] : pupils equal round and reactive to light [EOMI] : extraocular movements intact [Normal Outer Ear/Nose] : the outer ears and nose were normal in appearance [Normal Oropharynx] : the oropharynx was normal [No JVD] : no jugular venous distention [No Lymphadenopathy] : no lymphadenopathy [Supple] : supple [Thyroid Normal, No Nodules] : the thyroid was normal and there were no nodules present [No Respiratory Distress] : no respiratory distress  [No Accessory Muscle Use] : no accessory muscle use [Clear to Auscultation] : lungs were clear to auscultation bilaterally [Normal Rate] : normal rate  [Regular Rhythm] : with a regular rhythm [Normal S1, S2] : normal S1 and S2 [No Carotid Bruits] : no carotid bruits [No Abdominal Bruit] : a ~M bruit was not heard ~T in the abdomen [No Varicosities] : no varicosities [Pedal Pulses Present] : the pedal pulses are present [No Edema] : there was no peripheral edema [No Palpable Aorta] : no palpable aorta [No Extremity Clubbing/Cyanosis] : no extremity clubbing/cyanosis [Soft] : abdomen soft [Non Tender] : non-tender [Non-distended] : non-distended [No Masses] : no abdominal mass palpated [No HSM] : no HSM [Normal Bowel Sounds] : normal bowel sounds [Normal Posterior Cervical Nodes] : no posterior cervical lymphadenopathy [Normal Anterior Cervical Nodes] : no anterior cervical lymphadenopathy [No CVA Tenderness] : no CVA  tenderness [No Spinal Tenderness] : no spinal tenderness [No Joint Swelling] : no joint swelling [Grossly Normal Strength/Tone] : grossly normal strength/tone [Coordination Grossly Intact] : coordination grossly intact [No Focal Deficits] : no focal deficits [Normal Gait] : normal gait [Deep Tendon Reflexes (DTR)] : deep tendon reflexes were 2+ and symmetric [Normal Affect] : the affect was normal [Normal Insight/Judgement] : insight and judgment were intact [de-identified] : systolic murmur  [de-identified] : infected tick bite right flank

## 2023-09-19 ENCOUNTER — APPOINTMENT (OUTPATIENT)
Dept: PHYSICAL MEDICINE AND REHAB | Facility: CLINIC | Age: 78
End: 2023-09-19
Payer: MEDICARE

## 2023-09-19 VITALS — HEART RATE: 76 BPM | OXYGEN SATURATION: 96 % | RESPIRATION RATE: 16 BRPM | TEMPERATURE: 98.1 F

## 2023-09-19 DIAGNOSIS — B34.3 PARVOVIRUS INFECTION, UNSPECIFIED: ICD-10-CM

## 2023-09-19 DIAGNOSIS — C61 MALIGNANT NEOPLASM OF PROSTATE: ICD-10-CM

## 2023-09-19 DIAGNOSIS — B08.3 ERYTHEMA INFECTIOSUM [FIFTH DISEASE]: ICD-10-CM

## 2023-09-19 DIAGNOSIS — R21 RASH AND OTHER NONSPECIFIC SKIN ERUPTION: ICD-10-CM

## 2023-09-19 PROCEDURE — 99213 OFFICE O/P EST LOW 20 MIN: CPT | Mod: 25

## 2023-09-19 PROCEDURE — 99495 TRANSJ CARE MGMT MOD F2F 14D: CPT

## 2023-11-06 ENCOUNTER — LABORATORY RESULT (OUTPATIENT)
Age: 78
End: 2023-11-06

## 2023-11-06 ENCOUNTER — APPOINTMENT (OUTPATIENT)
Dept: PHYSICAL MEDICINE AND REHAB | Facility: CLINIC | Age: 78
End: 2023-11-06
Payer: MEDICARE

## 2023-11-06 VITALS
SYSTOLIC BLOOD PRESSURE: 124 MMHG | OXYGEN SATURATION: 97 % | DIASTOLIC BLOOD PRESSURE: 72 MMHG | RESPIRATION RATE: 16 BRPM | HEART RATE: 73 BPM | BODY MASS INDEX: 29.66 KG/M2 | TEMPERATURE: 97.9 F | HEIGHT: 67 IN | WEIGHT: 189 LBS
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
## 2023-11-06 PROCEDURE — 36410 VNPNXR 3YR/> PHY/QHP DX/THER: CPT

## 2023-11-06 PROCEDURE — 99214 OFFICE O/P EST MOD 30 MIN: CPT

## 2023-11-13 LAB
25(OH)D3 SERPL-MCNC: 50.7 NG/ML
ALBUMIN SERPL ELPH-MCNC: 4.3 G/DL
ALP BLD-CCNC: 91 U/L
ALT SERPL-CCNC: 16 U/L
ANION GAP SERPL CALC-SCNC: 13 MMOL/L
AST SERPL-CCNC: 22 U/L
BASOPHILS # BLD AUTO: 0.04 K/UL
BASOPHILS NFR BLD AUTO: 1 %
BILIRUB SERPL-MCNC: 0.4 MG/DL
BUN SERPL-MCNC: 17 MG/DL
CALCIUM SERPL-MCNC: 10.6 MG/DL
CHLORIDE SERPL-SCNC: 103 MMOL/L
CHOLEST SERPL-MCNC: 151 MG/DL
CO2 SERPL-SCNC: 25 MMOL/L
CREAT SERPL-MCNC: 0.92 MG/DL
EGFR: 86 ML/MIN/1.73M2
EOSINOPHIL # BLD AUTO: 0.12 K/UL
EOSINOPHIL NFR BLD AUTO: 2.9 %
GLUCOSE SERPL-MCNC: 110 MG/DL
HCT VFR BLD CALC: 43.7 %
HDLC SERPL-MCNC: 46 MG/DL
HGB BLD-MCNC: 13.7 G/DL
IMM GRANULOCYTES NFR BLD AUTO: 0.2 %
LDLC SERPL CALC-MCNC: 87 MG/DL
LYMPHOCYTES # BLD AUTO: 0.94 K/UL
LYMPHOCYTES NFR BLD AUTO: 22.3 %
MAN DIFF?: NORMAL
MCHC RBC-ENTMCNC: 31 PG
MCHC RBC-ENTMCNC: 31.4 GM/DL
MCV RBC AUTO: 98.9 FL
MONOCYTES # BLD AUTO: 0.77 K/UL
MONOCYTES NFR BLD AUTO: 18.3 %
NEUTROPHILS # BLD AUTO: 2.33 K/UL
NEUTROPHILS NFR BLD AUTO: 55.3 %
NONHDLC SERPL-MCNC: 105 MG/DL
PLATELET # BLD AUTO: 230 K/UL
POTASSIUM SERPL-SCNC: 5.7 MMOL/L
PROT SERPL-MCNC: 6.6 G/DL
RBC # BLD: 4.42 M/UL
RBC # FLD: 14.5 %
SODIUM SERPL-SCNC: 141 MMOL/L
TRIGL SERPL-MCNC: 95 MG/DL
WBC # FLD AUTO: 4.21 K/UL

## 2023-11-16 ENCOUNTER — APPOINTMENT (OUTPATIENT)
Dept: PHYSICAL MEDICINE AND REHAB | Facility: CLINIC | Age: 78
End: 2023-11-16
Payer: MEDICARE

## 2023-11-16 VITALS — BODY MASS INDEX: 29.66 KG/M2 | HEIGHT: 67 IN | WEIGHT: 189 LBS

## 2023-11-16 DIAGNOSIS — E87.5 HYPERKALEMIA: ICD-10-CM

## 2023-11-16 PROCEDURE — 99213 OFFICE O/P EST LOW 20 MIN: CPT | Mod: 95

## 2023-11-28 ENCOUNTER — OFFICE (OUTPATIENT)
Facility: LOCATION | Age: 78
Setting detail: OPHTHALMOLOGY
End: 2023-11-28
Payer: MEDICARE

## 2023-11-28 DIAGNOSIS — H35.033: ICD-10-CM

## 2023-11-28 DIAGNOSIS — H43.393: ICD-10-CM

## 2023-11-28 DIAGNOSIS — H40.1122: ICD-10-CM

## 2023-11-28 DIAGNOSIS — H16.223: ICD-10-CM

## 2023-11-28 DIAGNOSIS — H40.1111: ICD-10-CM

## 2023-11-28 PROCEDURE — 99213 OFFICE O/P EST LOW 20 MIN: CPT | Performed by: OPHTHALMOLOGY

## 2023-11-28 ASSESSMENT — CONFRONTATIONAL VISUAL FIELD TEST (CVF)
OS_FINDINGS: FULL
OD_FINDINGS: FULL

## 2023-11-28 ASSESSMENT — LID EXAM ASSESSMENTS
OS_BLEPHARITIS: 1+
OD_BLEPHARITIS: 1+

## 2023-11-28 ASSESSMENT — SUPERFICIAL PUNCTATE KERATITIS (SPK)
OS_SPK: T
OD_SPK: T

## 2023-11-29 ASSESSMENT — REFRACTION_AUTOREFRACTION
OD_CYLINDER: +0.50
OS_SPHERE: -1.25
OS_AXIS: 69
OD_SPHERE: -1.25
OD_AXIS: 160
OS_CYLINDER: +0.75

## 2023-11-29 ASSESSMENT — REFRACTION_CURRENTRX
OS_OVR_VA: 20/
OS_OVR_VA: 20/
OD_OVR_VA: 20/
OD_SPHERE: PLANO
OD_CYLINDER: +0.75
OD_SPHERE: +2.00
OS_ADD: +2.50
OD_ADD: +2.50
OD_AXIS: 5
OS_AXIS: 30
OS_SPHERE: -0.75
OS_SPHERE: +2.00
OS_CYLINDER: +1.50
OD_OVR_VA: 20/

## 2023-11-29 ASSESSMENT — SPHEQUIV_DERIVED
OD_SPHEQUIV: -1
OS_SPHEQUIV: -0.875

## 2024-01-16 ENCOUNTER — APPOINTMENT (OUTPATIENT)
Dept: ORTHOPEDIC SURGERY | Facility: CLINIC | Age: 79
End: 2024-01-16
Payer: MEDICARE

## 2024-01-16 VITALS — WEIGHT: 189 LBS | HEIGHT: 67 IN | BODY MASS INDEX: 29.66 KG/M2

## 2024-01-16 DIAGNOSIS — M17.11 UNILATERAL PRIMARY OSTEOARTHRITIS, RIGHT KNEE: ICD-10-CM

## 2024-01-16 DIAGNOSIS — S83.241A OTHER TEAR OF MEDIAL MENISCUS, CURRENT INJURY, RIGHT KNEE, INITIAL ENCOUNTER: ICD-10-CM

## 2024-01-16 PROCEDURE — 99214 OFFICE O/P EST MOD 30 MIN: CPT

## 2024-01-16 PROCEDURE — 73564 X-RAY EXAM KNEE 4 OR MORE: CPT | Mod: FY,RT

## 2024-01-16 NOTE — PHYSICAL EXAM
[NL (0)] : extension 0 degrees [5___] : hamstring 5[unfilled]/5 [Positive] : positive Jose Daniel [] : patient ambulates without assistive device [Right] : right knee [All Views] : anteroposterior, lateral, skyline, and anteroposterior standing [de-identified] : FLEXED KNEE GATE  [FreeTextEntry9] : laterally displaced smal lateral osteophytes medial joint space narrowing osteophytes sclerosis varus min open.   [TWNoteComboBox7] : flexion 130 degrees

## 2024-01-16 NOTE — DISCUSSION/SUMMARY
[de-identified] : Following discussion of the options the plan at this time is for the patient to go forward with organized therapy. Patient was provided with a Rx for PT at this time. Patient expressed understanding the treatment plan and will begin PT as soon as they can.  Lengthy discussion regarding options was had with the patient. Nonsurgical options including but not limited to cortisone, Visco supplementation, anti-inflammatory medications (both steroidal and non-steroidal), activity modification, non-impact exercise, maintaining a healthy BMI, bracing, and icing were reviewed. Surgical options including but not limited to arthroscopy, and joint replacement were discussed as was risks, benefits and alternatives.  Todays plan is for patient to use a knee brace PRN and take NSAIDs PRN for pain and inflammation patient will got to PT at this time as well. Discussed about possible HA injections in the future if knee dose not improve in pain.   f/u PRN

## 2024-04-02 ENCOUNTER — OFFICE (OUTPATIENT)
Facility: LOCATION | Age: 79
Setting detail: OPHTHALMOLOGY
End: 2024-04-02
Payer: MEDICARE

## 2024-04-02 DIAGNOSIS — H16.223: ICD-10-CM

## 2024-04-02 DIAGNOSIS — Z96.1: ICD-10-CM

## 2024-04-02 DIAGNOSIS — H43.393: ICD-10-CM

## 2024-04-02 DIAGNOSIS — H40.1122: ICD-10-CM

## 2024-04-02 DIAGNOSIS — H40.1111: ICD-10-CM

## 2024-04-02 DIAGNOSIS — H35.033: ICD-10-CM

## 2024-04-02 PROCEDURE — 92014 COMPRE OPH EXAM EST PT 1/>: CPT | Performed by: OPHTHALMOLOGY

## 2024-04-02 PROCEDURE — 92250 FUNDUS PHOTOGRAPHY W/I&R: CPT | Performed by: OPHTHALMOLOGY

## 2024-04-02 PROCEDURE — 92283 EXTND COLOR VISION XM: CPT | Performed by: OPHTHALMOLOGY

## 2024-04-02 PROCEDURE — 92083 EXTENDED VISUAL FIELD XM: CPT | Performed by: OPHTHALMOLOGY

## 2024-04-02 ASSESSMENT — LID EXAM ASSESSMENTS
OS_BLEPHARITIS: 1+
OD_BLEPHARITIS: 1+

## 2024-05-13 ENCOUNTER — LABORATORY RESULT (OUTPATIENT)
Age: 79
End: 2024-05-13

## 2024-05-13 ENCOUNTER — NON-APPOINTMENT (OUTPATIENT)
Age: 79
End: 2024-05-13

## 2024-05-13 ENCOUNTER — APPOINTMENT (OUTPATIENT)
Dept: PHYSICAL MEDICINE AND REHAB | Facility: CLINIC | Age: 79
End: 2024-05-13
Payer: MEDICARE

## 2024-05-13 VITALS
TEMPERATURE: 97.8 F | OXYGEN SATURATION: 98 % | HEIGHT: 67 IN | RESPIRATION RATE: 16 BRPM | BODY MASS INDEX: 31.08 KG/M2 | HEART RATE: 74 BPM | DIASTOLIC BLOOD PRESSURE: 80 MMHG | SYSTOLIC BLOOD PRESSURE: 124 MMHG | WEIGHT: 198 LBS

## 2024-05-13 DIAGNOSIS — I10 ESSENTIAL (PRIMARY) HYPERTENSION: ICD-10-CM

## 2024-05-13 DIAGNOSIS — I47.10 SUPRAVENTRICULAR TACHYCARDIA, UNSPECIFIED: ICD-10-CM

## 2024-05-13 DIAGNOSIS — E55.9 VITAMIN D DEFICIENCY, UNSPECIFIED: ICD-10-CM

## 2024-05-13 DIAGNOSIS — C61 MALIGNANT NEOPLASM OF PROSTATE: ICD-10-CM

## 2024-05-13 DIAGNOSIS — R01.1 CARDIAC MURMUR, UNSPECIFIED: ICD-10-CM

## 2024-05-13 DIAGNOSIS — Z00.00 ENCOUNTER FOR GENERAL ADULT MEDICAL EXAMINATION W/OUT ABNORMAL FINDINGS: ICD-10-CM

## 2024-05-13 PROCEDURE — 81003 URINALYSIS AUTO W/O SCOPE: CPT | Mod: QW

## 2024-05-13 PROCEDURE — 36410 VNPNXR 3YR/> PHY/QHP DX/THER: CPT

## 2024-05-13 PROCEDURE — G0439: CPT

## 2024-05-13 PROCEDURE — 93000 ELECTROCARDIOGRAM COMPLETE: CPT

## 2024-05-13 RX ORDER — LEVOCETIRIZINE DIHYDROCHLORIDE 5 MG/1
5 TABLET ORAL DAILY
Qty: 90 | Refills: 3 | Status: ACTIVE | COMMUNITY
Start: 2024-05-13 | End: 1900-01-01

## 2024-05-13 RX ORDER — ATORVASTATIN CALCIUM 40 MG/1
40 TABLET, FILM COATED ORAL
Qty: 90 | Refills: 3 | Status: ACTIVE | COMMUNITY

## 2024-05-13 NOTE — PLAN
[FreeTextEntry1] : EKG - NSR - 70, SOLANGE - 0.176, No acute T wave changes noted..  Labs Drawn by Dr. Dmitriy Devine due to poor venous access.  Patient required lab testing due to conditions in their past medical history requiring periodic monitoring.  Labs were sent to [facility] labs.  Patient to continue with present medications - all medications reconciled/reviewed during this visit and listed above.   Increase fluid intake..  RTO in 7-10 days for re-eval..

## 2024-05-13 NOTE — HISTORY OF PRESENT ILLNESS
[FreeTextEntry1] : Physical exam [de-identified] : Patient presents today for physical exam. His last PE was over a year ago and since that time he has not had any significant changes to his medical history. Denies any CP, SOB or diff breathing. No recent fever, chills, cough or cold type symptoms. Has no other complaints at this time.

## 2024-05-21 LAB
25(OH)D3 SERPL-MCNC: 36.5 NG/ML
ALBUMIN SERPL ELPH-MCNC: 4.3 G/DL
ALP BLD-CCNC: 97 U/L
ALT SERPL-CCNC: 16 U/L
ANION GAP SERPL CALC-SCNC: 12 MMOL/L
AST SERPL-CCNC: 20 U/L
B BURGDOR AB SER-IMP: NEGATIVE
B BURGDOR IGG+IGM SER QL: 0.02 INDEX
BASOPHILS # BLD AUTO: 0.03 K/UL
BASOPHILS NFR BLD AUTO: 0.7 %
BILIRUB SERPL-MCNC: 0.4 MG/DL
BILIRUB UR QL STRIP: NEGATIVE
BUN SERPL-MCNC: 18 MG/DL
CALCIUM SERPL-MCNC: 10.1 MG/DL
CHLORIDE SERPL-SCNC: 106 MMOL/L
CHOLEST SERPL-MCNC: 137 MG/DL
CO2 SERPL-SCNC: 25 MMOL/L
COLLECTION METHOD: NORMAL
CREAT SERPL-MCNC: 0.97 MG/DL
EGFR: 80 ML/MIN/1.73M2
EOSINOPHIL # BLD AUTO: 0.13 K/UL
EOSINOPHIL NFR BLD AUTO: 3.1 %
ESTIMATED AVERAGE GLUCOSE: 126 MG/DL
FERRITIN SERPL-MCNC: 68 NG/ML
FOLATE SERPL-MCNC: >20 NG/ML
GLUCOSE SERPL-MCNC: 132 MG/DL
GLUCOSE UR-MCNC: NEGATIVE
HBA1C MFR BLD HPLC: 6 %
HCG UR QL: 0.2 EU/DL
HCT VFR BLD CALC: 42.5 %
HDLC SERPL-MCNC: 51 MG/DL
HGB BLD-MCNC: 13.7 G/DL
HGB UR QL STRIP.AUTO: NEGATIVE
IMM GRANULOCYTES NFR BLD AUTO: 0.2 %
IRON SATN MFR SERPL: 23 %
IRON SERPL-MCNC: 98 UG/DL
KETONES UR-MCNC: NEGATIVE
LDLC SERPL CALC-MCNC: 69 MG/DL
LEUKOCYTE ESTERASE UR QL STRIP: NEGATIVE
LYMPHOCYTES # BLD AUTO: 0.96 K/UL
LYMPHOCYTES NFR BLD AUTO: 22.6 %
MAGNESIUM SERPL-MCNC: 2 MG/DL
MAN DIFF?: NORMAL
MCHC RBC-ENTMCNC: 31.2 PG
MCHC RBC-ENTMCNC: 32.2 GM/DL
MCV RBC AUTO: 96.8 FL
MONOCYTES # BLD AUTO: 0.71 K/UL
MONOCYTES NFR BLD AUTO: 16.7 %
NEUTROPHILS # BLD AUTO: 2.4 K/UL
NEUTROPHILS NFR BLD AUTO: 56.7 %
NITRITE UR QL STRIP: NEGATIVE
NONHDLC SERPL-MCNC: 86 MG/DL
PH UR STRIP: 6
PLATELET # BLD AUTO: 154 K/UL
POTASSIUM SERPL-SCNC: 4.7 MMOL/L
PROT SERPL-MCNC: 6.7 G/DL
PROT UR STRIP-MCNC: NEGATIVE
PSA SERPL-MCNC: <0.01 NG/ML
RBC # BLD: 4.39 M/UL
RBC # FLD: 14.9 %
SODIUM SERPL-SCNC: 143 MMOL/L
SP GR UR STRIP: 1.02
T3 SERPL-MCNC: 147 NG/DL
T3RU NFR SERPL: 1.2 TBI
T4 FREE SERPL-MCNC: 0.9 NG/DL
TIBC SERPL-MCNC: 427 UG/DL
TRIGL SERPL-MCNC: 89 MG/DL
TSH SERPL-ACNC: 2.65 UIU/ML
UIBC SERPL-MCNC: 329 UG/DL
VIT B12 SERPL-MCNC: 753 PG/ML
WBC # FLD AUTO: 4.24 K/UL

## 2024-05-23 ENCOUNTER — APPOINTMENT (OUTPATIENT)
Dept: PHYSICAL MEDICINE AND REHAB | Facility: CLINIC | Age: 79
End: 2024-05-23

## 2024-08-09 ENCOUNTER — RX ONLY (RX ONLY)
Age: 79
End: 2024-08-09

## 2024-08-09 ENCOUNTER — OFFICE (OUTPATIENT)
Facility: LOCATION | Age: 79
Setting detail: OPHTHALMOLOGY
End: 2024-08-09
Payer: MEDICARE

## 2024-08-09 DIAGNOSIS — H40.1111: ICD-10-CM

## 2024-08-09 DIAGNOSIS — H43.393: ICD-10-CM

## 2024-08-09 DIAGNOSIS — H35.033: ICD-10-CM

## 2024-08-09 DIAGNOSIS — H16.223: ICD-10-CM

## 2024-08-09 DIAGNOSIS — H40.1122: ICD-10-CM

## 2024-08-09 DIAGNOSIS — H02.403: ICD-10-CM

## 2024-08-09 PROBLEM — H01.004 BLEPHARITIS; RIGHT UPPER LID, RIGHT LOWER LID, LEFT UPPER LID, LEFT LOWER LID: Status: ACTIVE | Noted: 2024-08-09

## 2024-08-09 PROBLEM — H01.002 BLEPHARITIS; RIGHT UPPER LID, RIGHT LOWER LID, LEFT UPPER LID, LEFT LOWER LID: Status: ACTIVE | Noted: 2024-08-09

## 2024-08-09 PROBLEM — H01.005 BLEPHARITIS; RIGHT UPPER LID, RIGHT LOWER LID, LEFT UPPER LID, LEFT LOWER LID: Status: ACTIVE | Noted: 2024-08-09

## 2024-08-09 PROBLEM — H01.001 BLEPHARITIS; RIGHT UPPER LID, RIGHT LOWER LID, LEFT UPPER LID, LEFT LOWER LID: Status: ACTIVE | Noted: 2024-08-09

## 2024-08-09 PROCEDURE — 92133 CPTRZD OPH DX IMG PST SGM ON: CPT | Performed by: OPHTHALMOLOGY

## 2024-08-09 PROCEDURE — 92014 COMPRE OPH EXAM EST PT 1/>: CPT | Performed by: OPHTHALMOLOGY

## 2024-08-09 ASSESSMENT — CONFRONTATIONAL VISUAL FIELD TEST (CVF)
OS_FINDINGS: FULL
OD_FINDINGS: FULL

## 2024-08-09 ASSESSMENT — LID EXAM ASSESSMENTS
OD_BLEPHARITIS: 1+
OS_BLEPHARITIS: 1+

## 2024-08-09 ASSESSMENT — LID POSITION - PTOSIS
OS_PTOSIS: LUL 1+
OD_PTOSIS: RUL 1+

## 2024-09-03 ENCOUNTER — OFFICE (OUTPATIENT)
Dept: URBAN - METROPOLITAN AREA CLINIC 100 | Facility: CLINIC | Age: 79
Setting detail: OPHTHALMOLOGY
End: 2024-09-03
Payer: MEDICARE

## 2024-09-03 DIAGNOSIS — H02.834: ICD-10-CM

## 2024-09-03 DIAGNOSIS — H02.831: ICD-10-CM

## 2024-09-03 DIAGNOSIS — H53.40: ICD-10-CM

## 2024-09-03 DIAGNOSIS — H57.813: ICD-10-CM

## 2024-09-03 PROCEDURE — 92285 EXTERNAL OCULAR PHOTOGRAPHY: CPT | Performed by: OPHTHALMOLOGY

## 2024-09-03 PROCEDURE — 92082 INTERMEDIATE VISUAL FIELD XM: CPT | Performed by: OPHTHALMOLOGY

## 2024-09-03 PROCEDURE — 99214 OFFICE O/P EST MOD 30 MIN: CPT | Performed by: OPHTHALMOLOGY

## 2024-09-03 ASSESSMENT — LID POSITION - PTOSIS
OD_PTOSIS: RUL 1+
OS_PTOSIS: LUL 1+

## 2024-09-03 ASSESSMENT — CONFRONTATIONAL VISUAL FIELD TEST (CVF)
OS_FINDINGS: FULL
OD_FINDINGS: FULL

## 2024-09-03 ASSESSMENT — LID POSITION - DERMATOCHALASIS
OD_DERMATOCHALASIS: RUL 1+
OS_DERMATOCHALASIS: LUL 1+

## 2024-09-03 ASSESSMENT — LID EXAM ASSESSMENTS
OD_BLEPHARITIS: 1+
OS_BLEPHARITIS: 1+

## 2024-09-24 ENCOUNTER — APPOINTMENT (OUTPATIENT)
Dept: PHYSICAL MEDICINE AND REHAB | Facility: CLINIC | Age: 79
End: 2024-09-24
Payer: MEDICARE

## 2024-09-24 VITALS
HEART RATE: 85 BPM | BODY MASS INDEX: 31.08 KG/M2 | OXYGEN SATURATION: 97 % | DIASTOLIC BLOOD PRESSURE: 80 MMHG | SYSTOLIC BLOOD PRESSURE: 124 MMHG | WEIGHT: 198 LBS | HEIGHT: 67 IN | RESPIRATION RATE: 16 BRPM | TEMPERATURE: 97.9 F

## 2024-09-24 DIAGNOSIS — E55.9 VITAMIN D DEFICIENCY, UNSPECIFIED: ICD-10-CM

## 2024-09-24 DIAGNOSIS — J30.2 OTHER SEASONAL ALLERGIC RHINITIS: ICD-10-CM

## 2024-09-24 DIAGNOSIS — L25.9 UNSPECIFIED CONTACT DERMATITIS, UNSPECIFIED CAUSE: ICD-10-CM

## 2024-09-24 DIAGNOSIS — R01.1 CARDIAC MURMUR, UNSPECIFIED: ICD-10-CM

## 2024-09-24 DIAGNOSIS — I10 ESSENTIAL (PRIMARY) HYPERTENSION: ICD-10-CM

## 2024-09-24 PROCEDURE — 99213 OFFICE O/P EST LOW 20 MIN: CPT

## 2024-09-24 RX ORDER — AZELASTINE HYDROCHLORIDE 137 UG/1
137 SPRAY, METERED NASAL
Qty: 1 | Refills: 3 | Status: ACTIVE | COMMUNITY
Start: 2024-09-24 | End: 1900-01-01

## 2024-09-24 RX ORDER — PREDNISONE 20 MG/1
20 TABLET ORAL
Qty: 10 | Refills: 0 | Status: ACTIVE | COMMUNITY
Start: 2024-09-24 | End: 1900-01-01

## 2024-09-24 NOTE — HISTORY OF PRESENT ILLNESS
[FreeTextEntry1] : Rash [de-identified] : Patient encounter today for evaluation of rash. Patient complains of rash on both lower extremities. Patient complains of itch, no pain or heat to touch. Patient states he recently changed laundry detergents which may be the cause of symptoms. Denies any CP, SOB or diff breathing.  No recent fever, chills, cough or cold type symptoms.  Has no other complaints at this time.

## 2024-09-24 NOTE — PLAN
[FreeTextEntry1] : Patient to start course of prednisone Patient instructed to take liquid Benadryl before bed Patient instructed to change to hypoallergenic detergent going forward Patient requests refill of Azelastine nasal spray  Discussed and reviewed current medications as follows;   Atorvastatin- hypercholesterolemia, Lisinopril/Metoprolol- hypertension, Azelastine- seasonal alleries Patient to continue with present medications - all medications reconciled/reviewed during this visit and listed above.   Increase fluid intake. RTO in 7-10 days for re-evaluation. At least 20 minutes was spent with patient face to face examining and reviewing findings/results during this visit. Ample time was provided to answer any questions or address concerns to the patients satisfaction.   I, Lamar Mcdonnell, attest that this documentation has been prepared under the direction and in the presence of Provider Dmitriy Devine DNP  The documentation recorded by the scribe, in my presence, accurately reflects the service I personally performed, and the decisions made by me with my edits as appropriate. Dmitriy Devine DNP

## 2024-09-24 NOTE — PHYSICAL EXAM
[No Acute Distress] : no acute distress [Well Nourished] : well nourished [Well Developed] : well developed [Well-Appearing] : well-appearing [Normal Sclera/Conjunctiva] : normal sclera/conjunctiva [PERRL] : pupils equal round and reactive to light [EOMI] : extraocular movements intact [Normal Outer Ear/Nose] : the outer ears and nose were normal in appearance [Normal Oropharynx] : the oropharynx was normal [No JVD] : no jugular venous distention [No Lymphadenopathy] : no lymphadenopathy [Supple] : supple [Thyroid Normal, No Nodules] : the thyroid was normal and there were no nodules present [No Respiratory Distress] : no respiratory distress  [No Accessory Muscle Use] : no accessory muscle use [Clear to Auscultation] : lungs were clear to auscultation bilaterally [Normal Rate] : normal rate  [Regular Rhythm] : with a regular rhythm [Normal S1, S2] : normal S1 and S2 [No Murmur] : no murmur heard [No Carotid Bruits] : no carotid bruits [No Abdominal Bruit] : a ~M bruit was not heard ~T in the abdomen [No Varicosities] : no varicosities [Pedal Pulses Present] : the pedal pulses are present [No Edema] : there was no peripheral edema [No Palpable Aorta] : no palpable aorta [No Extremity Clubbing/Cyanosis] : no extremity clubbing/cyanosis [Soft] : abdomen soft [Non Tender] : non-tender [Non-distended] : non-distended [No Masses] : no abdominal mass palpated [No HSM] : no HSM [Normal Bowel Sounds] : normal bowel sounds [Normal Posterior Cervical Nodes] : no posterior cervical lymphadenopathy [Normal Anterior Cervical Nodes] : no anterior cervical lymphadenopathy [No CVA Tenderness] : no CVA  tenderness [No Spinal Tenderness] : no spinal tenderness [No Joint Swelling] : no joint swelling [Grossly Normal Strength/Tone] : grossly normal strength/tone [Coordination Grossly Intact] : coordination grossly intact [No Focal Deficits] : no focal deficits [Normal Gait] : normal gait [Deep Tendon Reflexes (DTR)] : deep tendon reflexes were 2+ and symmetric [Normal Affect] : the affect was normal [Normal Insight/Judgement] : insight and judgment were intact [de-identified] : rash SALEEM lower ext

## 2024-12-10 ENCOUNTER — OFFICE (OUTPATIENT)
Dept: URBAN - METROPOLITAN AREA CLINIC 100 | Facility: CLINIC | Age: 79
Setting detail: OPHTHALMOLOGY
End: 2024-12-10
Payer: MEDICARE

## 2024-12-10 DIAGNOSIS — H02.831: ICD-10-CM

## 2024-12-10 DIAGNOSIS — H57.813: ICD-10-CM

## 2024-12-10 DIAGNOSIS — H02.834: ICD-10-CM

## 2024-12-10 PROCEDURE — 67900 REPAIR BROW DEFECT: CPT | Mod: 51,50 | Performed by: OPHTHALMOLOGY

## 2024-12-10 PROCEDURE — 15823 BLEPHARP UPR EYELID XCSV SKN: CPT | Mod: 50 | Performed by: OPHTHALMOLOGY

## 2024-12-11 ENCOUNTER — RX ONLY (RX ONLY)
Age: 79
End: 2024-12-11

## 2024-12-11 ASSESSMENT — REFRACTION_AUTOREFRACTION
OD_CYLINDER: +0.75
OS_CYLINDER: +1.75
OD_SPHERE: -0.75
OD_AXIS: 159
OS_SPHERE: -1.25
OS_AXIS: 059

## 2024-12-11 ASSESSMENT — KERATOMETRY
OS_K2POWER_DIOPTERS: 40.25
OD_AXISANGLE_DEGREES: 046
OD_K1POWER_DIOPTERS: 41.75
OD_K2POWER_DIOPTERS: 42.50
OS_K1POWER_DIOPTERS: 39.75
OS_AXISANGLE_DEGREES: 061

## 2024-12-11 ASSESSMENT — VISUAL ACUITY
OD_BCVA: 20/40+2
OS_BCVA: 20/40+2

## 2024-12-16 ENCOUNTER — OFFICE (OUTPATIENT)
Facility: LOCATION | Age: 79
Setting detail: OPHTHALMOLOGY
End: 2024-12-16
Payer: MEDICARE

## 2024-12-16 DIAGNOSIS — H02.831: ICD-10-CM

## 2024-12-16 DIAGNOSIS — H40.1122: ICD-10-CM

## 2024-12-16 DIAGNOSIS — H57.813: ICD-10-CM

## 2024-12-16 DIAGNOSIS — H02.834: ICD-10-CM

## 2024-12-16 DIAGNOSIS — H40.1111: ICD-10-CM

## 2024-12-16 PROCEDURE — 99213 OFFICE O/P EST LOW 20 MIN: CPT | Performed by: OPHTHALMOLOGY

## 2024-12-16 ASSESSMENT — TONOMETRY
OS_IOP_MMHG: 16
OD_IOP_MMHG: 18

## 2024-12-16 ASSESSMENT — LID EXAM ASSESSMENTS
OD_BLEPHARITIS: 1+
OS_BLEPHARITIS: 1+

## 2024-12-16 ASSESSMENT — LID POSITION - PTOSIS
OS_PTOSIS: LUL 1+
OD_PTOSIS: RUL 1+

## 2024-12-16 ASSESSMENT — SUPERFICIAL PUNCTATE KERATITIS (SPK)
OD_SPK: T
OS_SPK: T

## 2024-12-16 ASSESSMENT — LID POSITION - COMMENTS
OS_COMMENTS: S/P BROW PTOSIS REPAIR
OD_COMMENTS: S/P BROW PTOSIS REPAIR

## 2024-12-16 ASSESSMENT — LID POSITION - DERMATOCHALASIS
OS_DERMATOCHALASIS: LUL 1+
OD_DERMATOCHALASIS: RUL 1+

## 2024-12-16 ASSESSMENT — CONFRONTATIONAL VISUAL FIELD TEST (CVF)
OD_FINDINGS: FULL
OS_FINDINGS: FULL

## 2024-12-17 ENCOUNTER — OFFICE (OUTPATIENT)
Dept: URBAN - METROPOLITAN AREA CLINIC 100 | Facility: CLINIC | Age: 79
Setting detail: OPHTHALMOLOGY
End: 2024-12-17
Payer: MEDICARE

## 2024-12-17 DIAGNOSIS — H57.813: ICD-10-CM

## 2024-12-17 DIAGNOSIS — H02.831: ICD-10-CM

## 2024-12-17 DIAGNOSIS — H02.834: ICD-10-CM

## 2024-12-17 PROBLEM — H02.40 PTOSIS OF EYELID, UNSPECIFIED: Status: ACTIVE | Noted: 2024-12-17

## 2024-12-17 PROCEDURE — 99024 POSTOP FOLLOW-UP VISIT: CPT | Performed by: OPHTHALMOLOGY

## 2024-12-17 ASSESSMENT — SUPERFICIAL PUNCTATE KERATITIS (SPK)
OS_SPK: T
OD_SPK: T

## 2024-12-17 ASSESSMENT — LID POSITION - PTOSIS
OD_PTOSIS: RUL 1+
OS_PTOSIS: LUL 1+

## 2024-12-17 ASSESSMENT — LID POSITION - COMMENTS
OD_COMMENTS: WOUND C/D/I, HEALING WELL.GOOD HEIGHT AND GOOD SYMMETRY.
OS_COMMENTS: WOUND C/D/I, HEALING WELL.GOOD HEIGHT AND GOOD SYMMETRY.

## 2024-12-17 ASSESSMENT — LID EXAM ASSESSMENTS
OD_BROW_PTOSIS: WOUND C/D/I, HEALING WELL.<BR />GOOD HEIGHT AND GOOD SYMMETRY.<BR />GOOD BROW ELEVATION.
OS_BLEPHARITIS: 1+
OS_COMMENTS: ABSENT LATERAL
OS_COMMENTS: ABSENT MEDIAL
OD_BLEPHARITIS: 1+
OD_COMMENTS: ABSENT LATERAL
OD_COMMENTS: ABSENT MEDIAL
OS_BROW_PTOSIS: WOUND C/D/I, HEALING WELL.<BR />GOOD HEIGHT AND GOOD SYMMETRY.<BR />GOOD BROW ELEVATION.

## 2024-12-17 ASSESSMENT — REFRACTION_AUTOREFRACTION
OS_AXIS: 059
OS_SPHERE: -1.25
OD_AXIS: 159
OD_CYLINDER: +0.75
OS_CYLINDER: +1.75
OD_SPHERE: -0.75

## 2024-12-17 ASSESSMENT — VISUAL ACUITY
OD_BCVA: 20/60-1
OS_BCVA: 20/50-2

## 2024-12-17 ASSESSMENT — KERATOMETRY
OD_K1POWER_DIOPTERS: 41.75
OS_K1POWER_DIOPTERS: 39.75
OS_K2POWER_DIOPTERS: 40.25
OD_K2POWER_DIOPTERS: 42.50
OD_AXISANGLE_DEGREES: 046
OS_AXISANGLE_DEGREES: 061

## 2024-12-17 ASSESSMENT — CONFRONTATIONAL VISUAL FIELD TEST (CVF)
OS_FINDINGS: FULL
OD_FINDINGS: FULL

## 2024-12-17 ASSESSMENT — LID POSITION - DERMATOCHALASIS
OD_DERMATOCHALASIS: ABSENT
OS_DERMATOCHALASIS: ABSENT

## 2024-12-20 PROBLEM — H02.831 DERMATOCHALSIS; RIGHT UPPER LID, LEFT UPPER LID: Status: ACTIVE | Noted: 2024-12-16

## 2024-12-20 PROBLEM — H01.00 BLEPHARITIS; RIGHT UPPER LID, RIGHT LOWER LID, LEFT UPPER LID, LEFT LOWER LID: Status: ACTIVE | Noted: 2024-12-16

## 2024-12-20 PROBLEM — H02.834 DERMATOCHALSIS; RIGHT UPPER LID, LEFT UPPER LID: Status: ACTIVE | Noted: 2024-12-16

## 2024-12-20 PROBLEM — H02.40 PTOSIS ; BOTH EYES: Status: ACTIVE | Noted: 2024-12-16

## 2024-12-20 ASSESSMENT — REFRACTION_AUTOREFRACTION
OD_CYLINDER: +0.75
OS_SPHERE: -1.25
OS_AXIS: 059
OD_AXIS: 159
OS_CYLINDER: +1.75
OD_SPHERE: -0.75

## 2024-12-20 ASSESSMENT — KERATOMETRY
OS_AXISANGLE_DEGREES: 061
OS_K2POWER_DIOPTERS: 40.25
OD_K2POWER_DIOPTERS: 42.50
OD_K1POWER_DIOPTERS: 41.75
OS_K1POWER_DIOPTERS: 39.75
OD_AXISANGLE_DEGREES: 046

## 2024-12-20 ASSESSMENT — VISUAL ACUITY
OD_BCVA: 20/40+2
OS_BCVA: 20/60-1

## 2025-01-20 ENCOUNTER — OFFICE (OUTPATIENT)
Facility: LOCATION | Age: 80
Setting detail: OPHTHALMOLOGY
End: 2025-01-20
Payer: MEDICARE

## 2025-01-20 DIAGNOSIS — H52.03: ICD-10-CM

## 2025-01-20 DIAGNOSIS — H02.831: ICD-10-CM

## 2025-01-20 DIAGNOSIS — H50.22: ICD-10-CM

## 2025-01-20 PROCEDURE — 92015 DETERMINE REFRACTIVE STATE: CPT | Performed by: OPTOMETRIST

## 2025-01-20 PROCEDURE — 99024 POSTOP FOLLOW-UP VISIT: CPT | Performed by: OPTOMETRIST

## 2025-01-20 ASSESSMENT — CONFRONTATIONAL VISUAL FIELD TEST (CVF)
OS_FINDINGS: FULL
OD_FINDINGS: FULL

## 2025-01-23 ASSESSMENT — REFRACTION_AUTOREFRACTION
OD_SPHERE: -1.00
OD_AXIS: 179
OS_SPHERE: -1.00
OS_CYLINDER: +1.75
OD_CYLINDER: +1.50
OS_AXIS: 44

## 2025-01-23 ASSESSMENT — REFRACTION_MANIFEST
OS_VPRISM: BD
OS_VA1: 20/20
OD_CYLINDER: +1.25
OS_SPHERE: +0.75
OD_VA1: 20/20
OD_AXIS: 25
OS_AXIS: 40
OD_SPHERE: +0.75
OS_CYLINDER: +1.50

## 2025-01-23 ASSESSMENT — KERATOMETRY
OS_K1POWER_DIOPTERS: 39.75
OD_K2POWER_DIOPTERS: 42.50
OS_K2POWER_DIOPTERS: 40.25
OS_AXISANGLE_DEGREES: 061
OD_AXISANGLE_DEGREES: 046
OD_K1POWER_DIOPTERS: 41.75

## 2025-01-23 ASSESSMENT — VISUAL ACUITY
OD_BCVA: 20/40-1
OS_BCVA: 20/50-1

## 2025-02-18 ENCOUNTER — OFFICE (OUTPATIENT)
Dept: URBAN - METROPOLITAN AREA CLINIC 100 | Facility: CLINIC | Age: 80
Setting detail: OPHTHALMOLOGY
End: 2025-02-18
Payer: MEDICARE

## 2025-02-18 DIAGNOSIS — H02.834: ICD-10-CM

## 2025-02-18 DIAGNOSIS — H57.813: ICD-10-CM

## 2025-02-18 DIAGNOSIS — H02.831: ICD-10-CM

## 2025-02-18 PROCEDURE — 99024 POSTOP FOLLOW-UP VISIT: CPT | Performed by: OPHTHALMOLOGY

## 2025-02-18 ASSESSMENT — KERATOMETRY
OS_K1POWER_DIOPTERS: 39.75
OD_K2POWER_DIOPTERS: 42.50
OD_K1POWER_DIOPTERS: 41.75
OS_K2POWER_DIOPTERS: 40.25
OD_AXISANGLE_DEGREES: 046
OS_AXISANGLE_DEGREES: 061

## 2025-02-18 ASSESSMENT — CONFRONTATIONAL VISUAL FIELD TEST (CVF)
OD_FINDINGS: FULL
OS_FINDINGS: FULL

## 2025-02-18 ASSESSMENT — LID POSITION - COMMENTS
OS_COMMENTS: WOUND C/D/I, HEALING WELL.GOOD HEIGHT AND GOOD SYMMETRY.
OD_COMMENTS: WOUND C/D/I, HEALING WELL.GOOD HEIGHT AND GOOD SYMMETRY.

## 2025-02-18 ASSESSMENT — LID EXAM ASSESSMENTS
OS_COMMENTS: ABSENT MEDIAL
OD_BROW_PTOSIS: WOUND C/D/I, HEALING WELL.<BR />GOOD HEIGHT AND GOOD SYMMETRY.<BR />GOOD BROW ELEVATION.
OS_COMMENTS: ABSENT LATERAL
OD_BLEPHARITIS: 1+
OS_BROW_PTOSIS: WOUND C/D/I, HEALING WELL.<BR />GOOD HEIGHT AND GOOD SYMMETRY.<BR />GOOD BROW ELEVATION.
OS_BLEPHARITIS: 1+
OD_COMMENTS: ABSENT MEDIAL
OD_COMMENTS: ABSENT LATERAL

## 2025-02-18 ASSESSMENT — LID POSITION - DERMATOCHALASIS
OS_DERMATOCHALASIS: ABSENT
OD_DERMATOCHALASIS: ABSENT

## 2025-02-18 ASSESSMENT — REFRACTION_AUTOREFRACTION
OD_CYLINDER: +1.50
OS_SPHERE: -1.00
OS_AXIS: 44
OS_CYLINDER: +1.75
OD_SPHERE: -1.00
OD_AXIS: 179

## 2025-02-18 ASSESSMENT — SUPERFICIAL PUNCTATE KERATITIS (SPK)
OD_SPK: T
OS_SPK: T

## 2025-02-18 ASSESSMENT — LID POSITION - PTOSIS
OS_PTOSIS: LUL 1+
OD_PTOSIS: RUL 1+

## 2025-02-18 ASSESSMENT — VISUAL ACUITY
OD_BCVA: 20/50
OS_BCVA: 20/50-2

## 2025-05-21 ENCOUNTER — OFFICE (OUTPATIENT)
Facility: LOCATION | Age: 80
Setting detail: OPHTHALMOLOGY
End: 2025-05-21
Payer: MEDICARE

## 2025-05-21 DIAGNOSIS — H40.1122: ICD-10-CM

## 2025-05-21 DIAGNOSIS — H40.1111: ICD-10-CM

## 2025-05-21 PROCEDURE — 92283 EXTND COLOR VISION XM: CPT | Performed by: OPHTHALMOLOGY

## 2025-05-21 PROCEDURE — 92083 EXTENDED VISUAL FIELD XM: CPT | Performed by: OPHTHALMOLOGY

## 2025-05-21 PROCEDURE — 99213 OFFICE O/P EST LOW 20 MIN: CPT | Performed by: OPHTHALMOLOGY

## 2025-05-21 ASSESSMENT — SUPERFICIAL PUNCTATE KERATITIS (SPK)
OD_SPK: T
OS_SPK: T

## 2025-05-21 ASSESSMENT — CONFRONTATIONAL VISUAL FIELD TEST (CVF)
OD_FINDINGS: FULL
OS_FINDINGS: FULL

## 2025-05-21 ASSESSMENT — LID EXAM ASSESSMENTS
OS_COMMENTS: ABSENT LATERAL
OD_COMMENTS: ABSENT MEDIAL
OS_BROW_PTOSIS: WOUND C/D/I, HEALING WELL.<BR />GOOD HEIGHT AND GOOD SYMMETRY.<BR />GOOD BROW ELEVATION.
OD_COMMENTS: ABSENT LATERAL
OD_BLEPHARITIS: 1+
OD_BROW_PTOSIS: WOUND C/D/I, HEALING WELL.<BR />GOOD HEIGHT AND GOOD SYMMETRY.<BR />GOOD BROW ELEVATION.
OS_COMMENTS: ABSENT MEDIAL
OS_BLEPHARITIS: 1+

## 2025-05-21 ASSESSMENT — LID POSITION - PTOSIS
OD_PTOSIS: RUL 1+
OS_PTOSIS: LUL 1+

## 2025-05-21 ASSESSMENT — TONOMETRY
OS_IOP_MMHG: 15
OD_IOP_MMHG: 15

## 2025-05-21 ASSESSMENT — LID POSITION - DERMATOCHALASIS
OD_DERMATOCHALASIS: ABSENT
OS_DERMATOCHALASIS: ABSENT

## 2025-05-22 ASSESSMENT — REFRACTION_AUTOREFRACTION
OD_AXIS: 006
OD_SPHERE: -1.00
OD_CYLINDER: +1.00
OS_SPHERE: -0.75
OS_CYLINDER: +1.00
OS_AXIS: 042

## 2025-05-22 ASSESSMENT — KERATOMETRY
OS_K2POWER_DIOPTERS: 40.25
OD_K2POWER_DIOPTERS: 42.50
OD_K1POWER_DIOPTERS: 41.75
OD_AXISANGLE_DEGREES: 046
OS_AXISANGLE_DEGREES: 061
OS_K1POWER_DIOPTERS: 39.75

## 2025-05-22 ASSESSMENT — REFRACTION_CURRENTRX
OD_AXIS: 028
OS_AXIS: 044
OD_CYLINDER: +1.00
OD_OVR_VA: 20/
OS_CYLINDER: +1.50
OS_VPRISM: BD
OD_SPHERE: +0.75
OS_SPHERE: +0.75
OS_OVR_VA: 20/

## 2025-05-22 ASSESSMENT — VISUAL ACUITY
OD_BCVA: 20/20-2
OS_BCVA: 20/20-2